# Patient Record
Sex: MALE | Race: OTHER | NOT HISPANIC OR LATINO | URBAN - METROPOLITAN AREA
[De-identification: names, ages, dates, MRNs, and addresses within clinical notes are randomized per-mention and may not be internally consistent; named-entity substitution may affect disease eponyms.]

---

## 2023-04-14 ENCOUNTER — INPATIENT (INPATIENT)
Facility: HOSPITAL | Age: 53
LOS: 2 days | Discharge: HOME | DRG: 65 | End: 2023-04-17
Attending: PSYCHIATRY & NEUROLOGY | Admitting: PSYCHIATRY & NEUROLOGY
Payer: COMMERCIAL

## 2023-04-14 VITALS
SYSTOLIC BLOOD PRESSURE: 145 MMHG | WEIGHT: 173.94 LBS | HEART RATE: 81 BPM | OXYGEN SATURATION: 99 % | DIASTOLIC BLOOD PRESSURE: 100 MMHG | TEMPERATURE: 98 F | RESPIRATION RATE: 17 BRPM

## 2023-04-14 LAB
ALBUMIN SERPL ELPH-MCNC: 4.1 G/DL — SIGNIFICANT CHANGE UP (ref 3.4–5)
ALP SERPL-CCNC: 125 U/L — HIGH (ref 40–120)
ALT FLD-CCNC: 41 U/L — SIGNIFICANT CHANGE UP (ref 12–42)
AMPHET UR-MCNC: NEGATIVE — SIGNIFICANT CHANGE UP
ANION GAP SERPL CALC-SCNC: 9 MMOL/L — SIGNIFICANT CHANGE UP (ref 9–16)
ANISOCYTOSIS BLD QL: SIGNIFICANT CHANGE UP
APPEARANCE UR: CLEAR — SIGNIFICANT CHANGE UP
APTT BLD: 33.3 SEC — SIGNIFICANT CHANGE UP (ref 27.5–35.5)
AST SERPL-CCNC: 27 U/L — SIGNIFICANT CHANGE UP (ref 15–37)
BARBITURATES UR SCN-MCNC: NEGATIVE — SIGNIFICANT CHANGE UP
BASOPHILS # BLD AUTO: 0.02 K/UL — SIGNIFICANT CHANGE UP (ref 0–0.2)
BASOPHILS NFR BLD AUTO: 0.3 % — SIGNIFICANT CHANGE UP (ref 0–2)
BENZODIAZ UR-MCNC: NEGATIVE — SIGNIFICANT CHANGE UP
BILIRUB SERPL-MCNC: 0.9 MG/DL — SIGNIFICANT CHANGE UP (ref 0.2–1.2)
BILIRUB UR-MCNC: NEGATIVE — SIGNIFICANT CHANGE UP
BUN SERPL-MCNC: 12 MG/DL — SIGNIFICANT CHANGE UP (ref 7–23)
CALCIUM SERPL-MCNC: 9.3 MG/DL — SIGNIFICANT CHANGE UP (ref 8.5–10.5)
CHLORIDE SERPL-SCNC: 105 MMOL/L — SIGNIFICANT CHANGE UP (ref 96–108)
CO2 SERPL-SCNC: 27 MMOL/L — SIGNIFICANT CHANGE UP (ref 22–31)
COCAINE METAB.OTHER UR-MCNC: NEGATIVE — SIGNIFICANT CHANGE UP
COLOR SPEC: YELLOW — SIGNIFICANT CHANGE UP
CREAT SERPL-MCNC: 1.13 MG/DL — SIGNIFICANT CHANGE UP (ref 0.5–1.3)
DIFF PNL FLD: ABNORMAL
EGFR: 77 ML/MIN/1.73M2 — SIGNIFICANT CHANGE UP
EOSINOPHIL # BLD AUTO: 0.08 K/UL — SIGNIFICANT CHANGE UP (ref 0–0.5)
EOSINOPHIL NFR BLD AUTO: 1.2 % — SIGNIFICANT CHANGE UP (ref 0–6)
GLUCOSE SERPL-MCNC: 109 MG/DL — HIGH (ref 70–99)
GLUCOSE UR QL: NEGATIVE — SIGNIFICANT CHANGE UP
HCT VFR BLD CALC: 42 % — SIGNIFICANT CHANGE UP (ref 39–50)
HGB BLD-MCNC: 14.7 G/DL — SIGNIFICANT CHANGE UP (ref 13–17)
IMM GRANULOCYTES NFR BLD AUTO: 0.1 % — SIGNIFICANT CHANGE UP (ref 0–0.9)
INR BLD: 1.18 — HIGH (ref 0.88–1.16)
KETONES UR-MCNC: NEGATIVE — SIGNIFICANT CHANGE UP
LACTATE SERPL-SCNC: 0.9 MMOL/L — SIGNIFICANT CHANGE UP (ref 0.4–2)
LEUKOCYTE ESTERASE UR-ACNC: NEGATIVE — SIGNIFICANT CHANGE UP
LYMPHOCYTES # BLD AUTO: 2.6 K/UL — SIGNIFICANT CHANGE UP (ref 1–3.3)
LYMPHOCYTES # BLD AUTO: 37.9 % — SIGNIFICANT CHANGE UP (ref 13–44)
MACROCYTES BLD QL: SIGNIFICANT CHANGE UP
MANUAL SMEAR VERIFICATION: SIGNIFICANT CHANGE UP
MCHC RBC-ENTMCNC: 35 GM/DL — SIGNIFICANT CHANGE UP (ref 32–36)
MCHC RBC-ENTMCNC: 38.9 PG — HIGH (ref 27–34)
MCV RBC AUTO: 111.1 FL — HIGH (ref 80–100)
METHADONE UR-MCNC: NEGATIVE — SIGNIFICANT CHANGE UP
MICROCYTES BLD QL: SLIGHT — SIGNIFICANT CHANGE UP
MONOCYTES # BLD AUTO: 0.65 K/UL — SIGNIFICANT CHANGE UP (ref 0–0.9)
MONOCYTES NFR BLD AUTO: 9.5 % — SIGNIFICANT CHANGE UP (ref 2–14)
NEUTROPHILS # BLD AUTO: 3.5 K/UL — SIGNIFICANT CHANGE UP (ref 1.8–7.4)
NEUTROPHILS NFR BLD AUTO: 51 % — SIGNIFICANT CHANGE UP (ref 43–77)
NITRITE UR-MCNC: NEGATIVE — SIGNIFICANT CHANGE UP
NRBC # BLD: 0 /100 WBCS — SIGNIFICANT CHANGE UP (ref 0–0)
OPIATES UR-MCNC: NEGATIVE — SIGNIFICANT CHANGE UP
PCP SPEC-MCNC: SIGNIFICANT CHANGE UP
PCP UR-MCNC: NEGATIVE — SIGNIFICANT CHANGE UP
PH UR: 7 — SIGNIFICANT CHANGE UP (ref 5–8)
PLAT MORPH BLD: NORMAL — SIGNIFICANT CHANGE UP
PLATELET # BLD AUTO: 256 K/UL — SIGNIFICANT CHANGE UP (ref 150–400)
POTASSIUM SERPL-MCNC: 4.2 MMOL/L — SIGNIFICANT CHANGE UP (ref 3.5–5.3)
POTASSIUM SERPL-SCNC: 4.2 MMOL/L — SIGNIFICANT CHANGE UP (ref 3.5–5.3)
PROT SERPL-MCNC: 7.6 G/DL — SIGNIFICANT CHANGE UP (ref 6.4–8.2)
PROT UR-MCNC: NEGATIVE MG/DL — SIGNIFICANT CHANGE UP
PROTHROM AB SERPL-ACNC: 13.8 SEC — HIGH (ref 10.5–13.4)
RBC # BLD: 3.78 M/UL — LOW (ref 4.2–5.8)
RBC # FLD: 12.7 % — SIGNIFICANT CHANGE UP (ref 10.3–14.5)
RBC BLD AUTO: ABNORMAL
RBC CASTS # UR COMP ASSIST: < 5 /HPF — SIGNIFICANT CHANGE UP
SARS-COV-2 RNA SPEC QL NAA+PROBE: SIGNIFICANT CHANGE UP
SODIUM SERPL-SCNC: 141 MMOL/L — SIGNIFICANT CHANGE UP (ref 132–145)
SP GR SPEC: <=1.005 — SIGNIFICANT CHANGE UP (ref 1–1.03)
THC UR QL: POSITIVE
TROPONIN I, HIGH SENSITIVITY RESULT: 7.7 NG/L — SIGNIFICANT CHANGE UP
UROBILINOGEN FLD QL: 0.2 E.U./DL — SIGNIFICANT CHANGE UP
WBC # BLD: 6.86 K/UL — SIGNIFICANT CHANGE UP (ref 3.8–10.5)
WBC # FLD AUTO: 6.86 K/UL — SIGNIFICANT CHANGE UP (ref 3.8–10.5)
WBC UR QL: < 5 /HPF — SIGNIFICANT CHANGE UP

## 2023-04-14 PROCEDURE — 70498 CT ANGIOGRAPHY NECK: CPT | Mod: 26

## 2023-04-14 PROCEDURE — 99285 EMERGENCY DEPT VISIT HI MDM: CPT

## 2023-04-14 PROCEDURE — 70496 CT ANGIOGRAPHY HEAD: CPT | Mod: 26

## 2023-04-14 PROCEDURE — 0042T: CPT | Mod: 26

## 2023-04-14 PROCEDURE — 71045 X-RAY EXAM CHEST 1 VIEW: CPT | Mod: 26

## 2023-04-14 PROCEDURE — 70551 MRI BRAIN STEM W/O DYE: CPT | Mod: 26

## 2023-04-14 PROCEDURE — 70450 CT HEAD/BRAIN W/O DYE: CPT | Mod: 26

## 2023-04-14 RX ORDER — OLMESARTAN MEDOXOMIL 5 MG/1
1 TABLET, FILM COATED ORAL
Refills: 0 | DISCHARGE

## 2023-04-14 RX ORDER — ASPIRIN/CALCIUM CARB/MAGNESIUM 324 MG
81 TABLET ORAL DAILY
Refills: 0 | Status: DISCONTINUED | OUTPATIENT
Start: 2023-04-14 | End: 2023-04-17

## 2023-04-14 RX ORDER — ASPIRIN/CALCIUM CARB/MAGNESIUM 324 MG
325 TABLET ORAL ONCE
Refills: 0 | Status: COMPLETED | OUTPATIENT
Start: 2023-04-14 | End: 2023-04-14

## 2023-04-14 RX ORDER — CLOPIDOGREL BISULFATE 75 MG/1
75 TABLET, FILM COATED ORAL DAILY
Refills: 0 | Status: DISCONTINUED | OUTPATIENT
Start: 2023-04-14 | End: 2023-04-17

## 2023-04-14 RX ORDER — ATORVASTATIN CALCIUM 80 MG/1
80 TABLET, FILM COATED ORAL AT BEDTIME
Refills: 0 | Status: DISCONTINUED | OUTPATIENT
Start: 2023-04-14 | End: 2023-04-15

## 2023-04-14 RX ORDER — CLOPIDOGREL BISULFATE 75 MG/1
300 TABLET, FILM COATED ORAL ONCE
Refills: 0 | Status: COMPLETED | OUTPATIENT
Start: 2023-04-14 | End: 2023-04-14

## 2023-04-14 RX ORDER — LABETALOL HCL 100 MG
10 TABLET ORAL ONCE
Refills: 0 | Status: COMPLETED | OUTPATIENT
Start: 2023-04-14 | End: 2023-04-14

## 2023-04-14 RX ORDER — LAMIVUDINE AND ZIDOVUDINE 150; 300 MG/1; MG/1
1 TABLET, FILM COATED ORAL
Refills: 0 | DISCHARGE

## 2023-04-14 RX ORDER — ATORVASTATIN CALCIUM 80 MG/1
80 TABLET, FILM COATED ORAL ONCE
Refills: 0 | Status: COMPLETED | OUTPATIENT
Start: 2023-04-14 | End: 2023-04-14

## 2023-04-14 RX ADMIN — Medication 10 MILLIGRAM(S): at 13:28

## 2023-04-14 RX ADMIN — Medication 81 MILLIGRAM(S): at 22:41

## 2023-04-14 RX ADMIN — CLOPIDOGREL BISULFATE 300 MILLIGRAM(S): 75 TABLET, FILM COATED ORAL at 15:25

## 2023-04-14 RX ADMIN — CLOPIDOGREL BISULFATE 75 MILLIGRAM(S): 75 TABLET, FILM COATED ORAL at 22:41

## 2023-04-14 RX ADMIN — Medication 325 MILLIGRAM(S): at 15:25

## 2023-04-14 RX ADMIN — ATORVASTATIN CALCIUM 80 MILLIGRAM(S): 80 TABLET, FILM COATED ORAL at 22:41

## 2023-04-14 RX ADMIN — ATORVASTATIN CALCIUM 80 MILLIGRAM(S): 80 TABLET, FILM COATED ORAL at 15:25

## 2023-04-14 NOTE — ED PROVIDER NOTE - PROGRESS NOTE DETAILS
normal HCT, discussed case w stroke code attending Dr Montano and pt Is not candidate for thrombolytis as deficit is minor. If symptoms do not resolve will need admission for MRI. BP on arrival 145/100. Will monitor and repeat. Pt w improved BP after labetalol dose. Neuro Dr Montano recommends , Plaavix 300, Lipitor 80, Admit to Neuro Tele for further ischemic work up.

## 2023-04-14 NOTE — H&P ADULT - NSHPPHYSICALEXAM_GEN_ALL_CORE
General: No acute distress, awake and alert  Cardiovascular: Regular rate and rhythm  Pulmonary: Anterior breath sounds clear bilaterally, no crackles or wheezing. No use of accessory muscles  GI: Abdomen soft, non-tender, non-distended    Neurologic:  -Mental status: Awake, alert, oriented to person, place, and time. Speech is fluent with intact naming, repetition, and comprehension, no dysarthria. Recent and remote memory intact. Follows commands. Attention/concentration intact. Fund of knowledge appropriate.  -Cranial nerves:   II: Visual fields are full to confrontation.  III, IV, VI: Extraocular movements are intact without nystagmus. Pupils equally round and reactive to light  V:  Facial sensation V1-V3 equal and intact   VII: Face is symmetric with normal eye closure and smile  VIII: Hearing is bilaterally intact   XI: Head turning and shoulder shrug are intact.  XII: Tongue protrudes midline  Motor: Normal bulk and tone. No pronator drift. Strength bilateral upper extremity 5/5, bilateral lower extremities 5/5.  Sensation: Decreased sensation on the right arm and leg, ~98%. No neglect or extinction on double simultaneous testing.  Coordination: No dysmetria on finger-to-nose bilaterally      NIHSS: 1

## 2023-04-14 NOTE — ED ADULT NURSE NOTE - OBJECTIVE STATEMENT
Patient presents with right sided arm and leg numbness and tingling sensation that started around 1145 this morning. Recently diagnosed with HTN and is started on meds. Patient was in UC and sent in for evaluation. Stroke code called. Patient went to CT scan. Currently patient reports numbness is improving to the right leg. Still has some numbness to right arm. Patient is hypertensive 145/100. Denies headache, nausea, vomiting, vision change, gait imbalance, confusion, SOB, CP, weakness, speech problem, dizziness.

## 2023-04-14 NOTE — H&P ADULT - NSHPLABSRESULTS_GEN_ALL_CORE
Fingerstick Blood Glucose: CAPILLARY BLOOD GLUCOSE      POCT Blood Glucose.: 116 mg/dL (14 Apr 2023 15:08)    LABS:                        14.7   6.86  )-----------( 256      ( 14 Apr 2023 13:00 )             42.0     04-14    141  |  105  |  12  ----------------------------<  109<H>  4.2   |  27  |  1.13    Ca    9.3      14 Apr 2023 13:00    TPro  7.6  /  Alb  4.1  /  TBili  0.9  /  DBili  x   /  AST  27  /  ALT  41  /  AlkPhos  125<H>  04-14    PT/INR - ( 14 Apr 2023 13:00 )   PT: 13.8 sec;   INR: 1.18          PTT - ( 14 Apr 2023 13:00 )  PTT:33.3 sec      Urinalysis Basic - ( 14 Apr 2023 13:21 )    Color: Yellow / Appearance: Clear / SG: <=1.005 / pH: x  Gluc: x / Ketone: NEGATIVE  / Bili: NEGATIVE / Urobili: 0.2 E.U./dL   Blood: x / Protein: NEGATIVE mg/dL / Nitrite: NEGATIVE   Leuk Esterase: NEGATIVE / RBC: < 5 /HPF / WBC < 5 /HPF   Sq Epi: x / Non Sq Epi: x / Bacteria: x      RADIOLOGY  < from: CT Brain Stroke Protocol (04.14.23 @ 12:51) >      IMPRESSION: No intracranial hemorrhage or acute transcortical infarct.    < end of copied text >    Abnormal perfusion scan, with small but questionable defects:  Small left inferior frontal prolonged Tmax and small right parietal lobe   CBV deficit.    < end of copied text >    < from: CT Angio Neck Stroke Protocol w/ IV Cont (04.14.23 @ 13:09) >    Extracranial CTA:  1. No arterial steno-occlusive disease or evidence of dissection.  2. Moderately advanced cervical spondylosis present with at least   moderate spinal stenosis at several levels.    < end of copied text >    < from: CT Angio Brain Stroke Protocol  w/ IV Cont (04.14.23 @ 13:13) >    Intracranial CTA: No arterial steno-occlusive disease.    < end of copied text >

## 2023-04-14 NOTE — ED PROVIDER NOTE - CLINICAL SUMMARY MEDICAL DECISION MAKING FREE TEXT BOX
Pt presents for R sided arm/leg numbness for 1 hr. no motor symptoms, no  aphasia, no ataxia. Stroke code work up started.

## 2023-04-14 NOTE — ED PROVIDER NOTE - WR ORDER ID 1
social groups, or volunteer to help others. Being alone sometimes makes things seem worse than they are. · Get at least 30 minutes of exercise on most days of the week to relieve stress. Walking is a good choice. You also may want to do other activities, such as running, swimming, cycling, or playing tennis or team sports. Relaxation techniques  Do relaxation exercises 10 to 20 minutes a day. You can play soothing, relaxing music while you do them, if you wish. · Tell others in your house that you are going to do your relaxation exercises. Ask them not to disturb you. · Find a comfortable place, away from all distractions and noise. · Lie down on your back, or sit with your back straight. · Focus on your breathing. Make it slow and steady. · Breathe in through your nose. Breathe out through either your nose or mouth. · Breathe deeply, filling up the area between your navel and your rib cage. Breathe so that your belly goes up and down. · Do not hold your breath. · Breathe like this for 5 to 10 minutes. Notice the feeling of calmness throughout your whole body. As you continue to breathe slowly and deeply, relax by doing the following for another 5 to 10 minutes:  · Tighten and relax each muscle group in your body. You can begin at your toes and work your way up to your head. · Imagine your muscle groups relaxing and becoming heavy. · Empty your mind of all thoughts. · Let yourself relax more and more deeply. · Become aware of the state of calmness that surrounds you. · When your relaxation time is over, you can bring yourself back to alertness by moving your fingers and toes and then your hands and feet and then stretching and moving your entire body. Sometimes people fall asleep during relaxation, but they usually wake up shortly afterward. · Always give yourself time to return to full alertness before you drive a car or do anything that might cause an accident if you are not fully alert.  Never [id number]

## 2023-04-14 NOTE — ED PROVIDER NOTE - OBJECTIVE STATEMENT
med: caletra, combivert, olmesartan medoximil 53 yo male pt, hx of HIV, undetctable VL, meds: caletra, combivert. Recently dx w HTN and started on olmesartan medoximil 2 weeks ago. Today was in a meeting and started experiencing R sided arm/leg tingling and numbness. Symptoms started around 1 hr PTA, around 11:45 am. No motor symptoms, no aphasia, no ataxia. Pt walked to UC and then told to come to ED and pt walked to ED. On arrival BP is 145/100. no hx pf prior strokes. Hx of prior R hip replacement and L knee replacement.

## 2023-04-14 NOTE — H&P ADULT - NSHPSOCIALHISTORY_GEN_ALL_CORE
Patient lives with partner at apt  Smoking status:no  Drinkin-2 drinks daily with dinner  Drug Use: marijuana use daily

## 2023-04-14 NOTE — H&P ADULT - ASSESSMENT
52y Male with PMHx of HIV, newly diagnosed HTN 2 weeks ago presented to [facility] with right sided numbness in face, arm and leg. LKW 11:45am 4/14 while in meeting at work with sudden onset of symptoms, sensation down to 70%.  NIHSS 1 for decreased sensation. CTH/CTA unremarkable, but CTP suspicious for defect along left frontal and right parietal area.     Neuro  #CVA   - continue aspirin 81mg and plavix 75mg daily  - continue atorvastatin 80mg daily  - q4hr stroke neuro checks and vitals  - obtain MRI Brain without contrast  - Stroke Code HCT Results: neg  - Stroke Code CTA Results: neg  - Stroke education    Cards  #HTN  - permissive hypertension, Goal -180  - hold home blood pressure medication for now, olmesartan 20mg daily  - obtain TTE with bubble, ANASTASIA  - Stroke Code EKG Results: NSR    - high dose statin as above in CVA  - LDL results:     Pulm  - call provider if SPO2 < 94%    GI  #Nutrition/Fluids/Electrolytes   - replete K<4 and Mg <2  - Diet:: DASH  - IVF: none    Infectious Disease  #HIV  Reportedly viral load undetectable  - c/w home kaletra and combivir    Endocrine  - A1C results:   - TSH results:    DVT Prophylaxis  - lovenox sq for DVT prophylaxis   - SCDs for DVT prophylaxis       Dispo: pending PT/OT assessment, likely no needs     Discussed daily hospital plans and goals with patient and family at bedside.     Discussed with Neurology Attending Dr. Montano

## 2023-04-14 NOTE — ED PROVIDER NOTE - NEUROLOGICAL, MLM
Alert and oriented, normal strength, decreased sensation R face, R arm, R leg, no aphasia, no ataxia

## 2023-04-14 NOTE — H&P ADULT - NSHPREVIEWOFSYSTEMS_GEN_ALL_CORE
onstitutional: No fever, weight loss or fatigue  Eyes: No eye pain, visual disturbances, or discharge  ENMT:  No difficulty hearing, tinnitus, vertigo; No sinus or throat pain  Neck: No pain or stiffness  Respiratory: No cough, wheezing, chills or hemoptysis  Cardiovascular: No chest pain, palpitations, shortness of breath, dizziness or leg swelling  Gastrointestinal: No abdominal pain. No nausea, vomiting or hematemesis; No diarrhea or constipation. Nohematochezia.  Genitourinary: No dysuria, frequency, hematuria or incontinence  Neurological: As per HPI

## 2023-04-14 NOTE — PATIENT PROFILE ADULT - FALL HARM RISK - UNIVERSAL INTERVENTIONS
180.9 Bed in lowest position, wheels locked, appropriate side rails in place/Call bell, personal items and telephone in reach/Instruct patient to call for assistance before getting out of bed or chair/Non-slip footwear when patient is out of bed/[place] to call system/Physically safe environment - no spills, clutter or unnecessary equipment/Purposeful Proactive Rounding/Room/bathroom lighting operational, light cord in reach

## 2023-04-14 NOTE — H&P ADULT - HISTORY OF PRESENT ILLNESS
hx HIV, newly diagnosed HTN 2 weeks ago, LKW 11:45 while patient in meeting at work with sudden onset of right sided numbness in face, arm, leg. Decreased sensation down to 70%. Initially thought leg fell asleep/nerve problem, but did not improve. Went to  where BP was elevated around 170s (normally pt BP 140s). Sent to [facility]. NIHSS 1 for decreased right sided sensation/numbness. CT unremarkable. Plan for MRI and echo. DAPT.     full note to follow **STROKE HPI***    HPI: 52y Male with PMHx of HIV, newly diagnosed HTN 2 weeks ago presented to [facility] with right sided numbness in face, arm and leg. LKW 11:45am 4/14 while in meeting at work with sudden onset of symptoms. Initialy thought leg and arm fell asleep or nerve issue that would resolve on its own. Decreased sensation down to 70%. Patient walked to urgent care and was sent [facility]. On presentation, BP 170s (normally 140s). NIHSS 1 for decreased sensation. CTH/CTA unremarkable, but CTP suspicious for defect along left frontal and right parietal area. Patient currently reports resolution of facial numbness and significantly improving arm and leg numbness. Denies speech changes, vision changes, extremity weakness or loss of dexterity, CP, SOB, recent illness.

## 2023-04-15 LAB
A1C WITH ESTIMATED AVERAGE GLUCOSE RESULT: 5 % — SIGNIFICANT CHANGE UP (ref 4–5.6)
ANION GAP SERPL CALC-SCNC: 10 MMOL/L — SIGNIFICANT CHANGE UP (ref 5–17)
BUN SERPL-MCNC: 10 MG/DL — SIGNIFICANT CHANGE UP (ref 7–23)
CALCIUM SERPL-MCNC: 9.4 MG/DL — SIGNIFICANT CHANGE UP (ref 8.4–10.5)
CHLORIDE SERPL-SCNC: 105 MMOL/L — SIGNIFICANT CHANGE UP (ref 96–108)
CHOLEST SERPL-MCNC: 180 MG/DL — SIGNIFICANT CHANGE UP
CHOLEST SERPL-MCNC: 185 MG/DL — SIGNIFICANT CHANGE UP
CK SERPL-CCNC: 129 U/L — SIGNIFICANT CHANGE UP (ref 30–200)
CO2 SERPL-SCNC: 22 MMOL/L — SIGNIFICANT CHANGE UP (ref 22–31)
CREAT SERPL-MCNC: 0.8 MG/DL — SIGNIFICANT CHANGE UP (ref 0.5–1.3)
D DIMER BLD IA.RAPID-MCNC: <150 NG/ML DDU — SIGNIFICANT CHANGE UP
EGFR: 105 ML/MIN/1.73M2 — SIGNIFICANT CHANGE UP
ESTIMATED AVERAGE GLUCOSE: 97 MG/DL — SIGNIFICANT CHANGE UP (ref 68–114)
GLUCOSE SERPL-MCNC: 96 MG/DL — SIGNIFICANT CHANGE UP (ref 70–99)
HCT VFR BLD CALC: 43.2 % — SIGNIFICANT CHANGE UP (ref 39–50)
HCYS SERPL-MCNC: 8.3 UMOL/L — SIGNIFICANT CHANGE UP
HDLC SERPL-MCNC: 46 MG/DL — SIGNIFICANT CHANGE UP
HDLC SERPL-MCNC: 52 MG/DL — SIGNIFICANT CHANGE UP
HGB BLD-MCNC: 15 G/DL — SIGNIFICANT CHANGE UP (ref 13–17)
LIPID PNL WITH DIRECT LDL SERPL: 106 MG/DL — HIGH
LIPID PNL WITH DIRECT LDL SERPL: 111 MG/DL — HIGH
MAGNESIUM SERPL-MCNC: 2.1 MG/DL — SIGNIFICANT CHANGE UP (ref 1.6–2.6)
MCHC RBC-ENTMCNC: 34.7 GM/DL — SIGNIFICANT CHANGE UP (ref 32–36)
MCHC RBC-ENTMCNC: 39.4 PG — HIGH (ref 27–34)
MCV RBC AUTO: 113.4 FL — HIGH (ref 80–100)
NON HDL CHOLESTEROL: 133 MG/DL — HIGH
NON HDL CHOLESTEROL: 134 MG/DL — HIGH
NRBC # BLD: 0 /100 WBCS — SIGNIFICANT CHANGE UP (ref 0–0)
PHOSPHATE SERPL-MCNC: 3.8 MG/DL — SIGNIFICANT CHANGE UP (ref 2.5–4.5)
PLATELET # BLD AUTO: 239 K/UL — SIGNIFICANT CHANGE UP (ref 150–400)
POTASSIUM SERPL-MCNC: 4 MMOL/L — SIGNIFICANT CHANGE UP (ref 3.5–5.3)
POTASSIUM SERPL-SCNC: 4 MMOL/L — SIGNIFICANT CHANGE UP (ref 3.5–5.3)
RBC # BLD: 3.81 M/UL — LOW (ref 4.2–5.8)
RBC # FLD: 13.1 % — SIGNIFICANT CHANGE UP (ref 10.3–14.5)
SODIUM SERPL-SCNC: 137 MMOL/L — SIGNIFICANT CHANGE UP (ref 135–145)
TRIGL SERPL-MCNC: 109 MG/DL — SIGNIFICANT CHANGE UP
TRIGL SERPL-MCNC: 138 MG/DL — SIGNIFICANT CHANGE UP
TSH SERPL-MCNC: 1.2 UIU/ML — SIGNIFICANT CHANGE UP (ref 0.27–4.2)
WBC # BLD: 5.85 K/UL — SIGNIFICANT CHANGE UP (ref 3.8–10.5)
WBC # FLD AUTO: 5.85 K/UL — SIGNIFICANT CHANGE UP (ref 3.8–10.5)

## 2023-04-15 PROCEDURE — 99233 SBSQ HOSP IP/OBS HIGH 50: CPT

## 2023-04-15 PROCEDURE — 99222 1ST HOSP IP/OBS MODERATE 55: CPT

## 2023-04-15 PROCEDURE — 99254 IP/OBS CNSLTJ NEW/EST MOD 60: CPT

## 2023-04-15 RX ORDER — ALBUTEROL 90 UG/1
2 AEROSOL, METERED ORAL EVERY 6 HOURS
Refills: 0 | Status: ACTIVE | OUTPATIENT
Start: 2023-04-15 | End: 2024-03-13

## 2023-04-15 RX ORDER — LOSARTAN POTASSIUM 100 MG/1
25 TABLET, FILM COATED ORAL DAILY
Refills: 0 | Status: ACTIVE | OUTPATIENT
Start: 2023-04-15 | End: 2024-03-13

## 2023-04-15 RX ORDER — ATORVASTATIN CALCIUM 80 MG/1
10 TABLET, FILM COATED ORAL AT BEDTIME
Refills: 0 | Status: DISCONTINUED | OUTPATIENT
Start: 2023-04-15 | End: 2023-04-17

## 2023-04-15 RX ORDER — LORATADINE 10 MG/1
10 TABLET ORAL DAILY
Refills: 0 | Status: ACTIVE | OUTPATIENT
Start: 2023-04-15 | End: 2024-03-13

## 2023-04-15 RX ORDER — ENOXAPARIN SODIUM 100 MG/ML
40 INJECTION SUBCUTANEOUS EVERY 24 HOURS
Refills: 0 | Status: DISCONTINUED | OUTPATIENT
Start: 2023-04-15 | End: 2023-04-17

## 2023-04-15 RX ADMIN — LORATADINE 10 MILLIGRAM(S): 10 TABLET ORAL at 19:06

## 2023-04-15 RX ADMIN — ATORVASTATIN CALCIUM 10 MILLIGRAM(S): 80 TABLET, FILM COATED ORAL at 21:28

## 2023-04-15 RX ADMIN — CLOPIDOGREL BISULFATE 75 MILLIGRAM(S): 75 TABLET, FILM COATED ORAL at 12:25

## 2023-04-15 RX ADMIN — LOSARTAN POTASSIUM 25 MILLIGRAM(S): 100 TABLET, FILM COATED ORAL at 11:31

## 2023-04-15 RX ADMIN — ENOXAPARIN SODIUM 40 MILLIGRAM(S): 100 INJECTION SUBCUTANEOUS at 12:25

## 2023-04-15 RX ADMIN — Medication 2 TABLET(S): at 07:45

## 2023-04-15 RX ADMIN — Medication 81 MILLIGRAM(S): at 12:25

## 2023-04-15 NOTE — CONSULT NOTE ADULT - ASSESSMENT
52M with PMH Of HTN and HIV, presenting with Right sided numbness of the face, arm and leg.  Admitted for further workup.     #Right sided numbness  -#CVA  - plan per primary team  - started on aspirin and plavix, as well as atorvastatin (may need to adjust dose for HIV medications)  - continue neuro checks  - PT/OT consult  - DVT ppx per primary team  - MRI with  Acute right frontal lobe white matter infarct, and acute left thalamic   lacunar infarct which explains symptoms.  - pending TTE and ANASTASIA.        #HTN  - permissive hypertension, Goal -180  - can resume antihypertensives per primary team    #HIV  Reportedly viral load undetectable.  Compliant with meds  - continue home kaletra and combivir    >60 minutes spent on this encounter, including face to face with patient, care coordination and documentation.     
Neurology    52 y o Male with PMHx of HIV, newly diagnosed HTN 2 weeks ago presented to [facility] with right sided numbness in face, arm and leg. LKW 11:45am 4/14 while in meeting at work with sudden onset of symptoms, sensation down to 70%.  NIHSS 1 for decreased sensation. CTH/CTA unremarkable, but CTP suspicious for defect along left frontal and right parietal area.     Neuro  #CVA   - continue aspirin 81mg and plavix 75mg daily  - continue atorvastatin 80mg daily  - q4hr stroke neuro checks and vitals  - obtain MRI Brain without contrast  - Stroke Code HCT Results: neg  - Stroke Code CTA Results: neg  - Stroke education    Cards  #HTN  - permissive hypertension, Goal -180  - hold home blood pressure medication for now, olmesartan 20mg daily  - obtain TTE with bubble, ANASTASIA  - Stroke Code EKG Results: NSR    - high dose statin as above in CVA  - LDL results:     Pulm  - call provider if SPO2 < 94%    GI  #Nutrition/Fluids/Electrolytes   - replete K<4 and Mg <2  - Diet:: DASH  - IVF: none    Infectious Disease  #HIV  Reportedly viral load undetectable  - c/w home kaletra and combivir    Endocrine  - A1C results:   - TSH results:    DVT Prophylaxis  - lovenox sq for DVT prophylaxis   - SCDs for DVT prophylaxis

## 2023-04-15 NOTE — PROGRESS NOTE ADULT - SUBJECTIVE AND OBJECTIVE BOX
Neurology Stroke Progress Note    INTERVAL HPI/OVERNIGHT EVENTS:  Patient seen and examined this morning by stroke ACP and attending. Patient aware of MRI results showing stroke. Explained plan with patient at length. Denies any complaints and states that he is back to his baseline.     MEDICATIONS  (STANDING):  aspirin enteric coated 81 milliGRAM(s) Oral daily  atorvastatin 10 milliGRAM(s) Oral at bedtime  clopidogrel Tablet 75 milliGRAM(s) Oral daily  enoxaparin Injectable 40 milliGRAM(s) SubCutaneous every 24 hours  lopinavir 200 mG/ritonavir 50 mG 2 Tablet(s) Oral two times a day  losartan 25 milliGRAM(s) Oral daily  zidovudine 300 mG/lamivudine 150 mG 1 Tablet(s) Oral two times a day    MEDICATIONS  (PRN):      Allergies    No Known Allergies    Intolerances        ROS: As per HPI, otherwise negative    Vital Signs Last 24 Hrs  T(C): 36.4 (15 Apr 2023 10:21), Max: 36.9 (14 Apr 2023 15:30)  T(F): 97.6 (15 Apr 2023 10:21), Max: 98.5 (14 Apr 2023 15:30)  HR: 66 (15 Apr 2023 09:07) (62 - 81)  BP: 168/104 (15 Apr 2023 09:07) (143/86 - 171/94)  BP(mean): 131 (15 Apr 2023 09:07) (119 - 131)  RR: 17 (15 Apr 2023 09:07) (16 - 20)  SpO2: 99% (15 Apr 2023 09:07) (95% - 100%)    Parameters below as of 15 Apr 2023 09:07  Patient On (Oxygen Delivery Method): room air        Physical exam:  General: awake and alert, sitting comfortably, no acute distress    Neurologic:  Mental status: awake, alert, oriented to person, place, and time. Speech is fluent, able to name objects. Follows commands. Attention/concentration intact. No dysarthria, no aphasia.  Cranial nerves:   II: visual fields are full to confrontation. pupils equally round and reactive to light,   III, IV, VI: EOMI without nystagmus  V:  V1-V3 sensation intact,   VII: no facial droop, facie is symmetric with normal eye closure and smile  Motor: Normal bulk and tone, strength 5/5 in b/l UE and LE,  strength 5/5. No pronator drift  Sensation: intact to light touch. No neglect.  Coordination: No dysmetria on finger-to-nose bilaterally  Reflexes: downgoing toes bilaterally  Gait: narrow and steady, no ataxia. Romberg negative    LABS:                        15.0   5.85  )-----------( 239      ( 15 Apr 2023 05:30 )             43.2     04-15    137  |  105  |  10  ----------------------------<  96  4.0   |  22  |  0.80    Ca    9.4      15 Apr 2023 05:30  Phos  3.8     04-15  Mg     2.1     04-15    TPro  7.6  /  Alb  4.1  /  TBili  0.9  /  DBili  x   /  AST  27  /  ALT  41  /  AlkPhos  125<H>  04-14    PT/INR - ( 14 Apr 2023 13:00 )   PT: 13.8 sec;   INR: 1.18          PTT - ( 14 Apr 2023 13:00 )  PTT:33.3 sec  Urinalysis Basic - ( 14 Apr 2023 13:21 )    Color: Yellow / Appearance: Clear / SG: <=1.005 / pH: x  Gluc: x / Ketone: NEGATIVE  / Bili: NEGATIVE / Urobili: 0.2 E.U./dL   Blood: x / Protein: NEGATIVE mg/dL / Nitrite: NEGATIVE   Leuk Esterase: NEGATIVE / RBC: < 5 /HPF / WBC < 5 /HPF   Sq Epi: x / Non Sq Epi: x / Bacteria: x        RADIOLOGY & ADDITIONAL TESTS:     MR Head No Cont (04.14.23 @ 21:27) >    IMPRESSION:    Acute right frontal lobe white matter infarct, and acute left thalamic   lacunar infarct which explains symptoms.      CT Brain Stroke Protocol (04.14.23 @ 12:51) >    IMPRESSION: No intracranial hemorrhage or acute transcortical infarct.     CT Angio Neck Stroke Protocol w/ IV Cont (04.14.23 @ 13:09) >  IMPRESSION:    Intracranial CTA: No arterial steno-occlusive disease.    Extracranial CTA:  1. No arterial steno-occlusive disease or evidence of dissection.  2. Moderately advanced cervical spondylosis present with at least   moderate spinal stenosis at several levels.    CT Perfusion w/ Maps w/ IV Cont (04.14.23 @ 13:07) >  IMPRESSION:  Abnormal perfusion scan, with small but questionable defects:  Small left inferior frontal prolonged Tmax and small right parietal lobe   CBV deficit.

## 2023-04-15 NOTE — PHYSICAL THERAPY INITIAL EVALUATION ADULT - ADDITIONAL COMMENTS
Pt lives with partner in an elevator access apartment. Pt reports to be indpt with all ADLs prior to admission and denies use of AD for ambulation.

## 2023-04-15 NOTE — PHYSICAL THERAPY INITIAL EVALUATION ADULT - PERTINENT HX OF CURRENT PROBLEM, REHAB EVAL
52y Male with PMHx of HIV, newly diagnosed HTN 2 weeks ago presented to [facility] with right sided numbness in face, arm and leg. LKW 11:45am 4/14 while in meeting at work with sudden onset of symptoms. Initialy thought leg and arm fell asleep or nerve issue that would resolve on its own. Decreased sensation down to 70%. Patient walked to urgent care and was sent [facility]. On presentation, BP 170s (normally 140s). NIHSS 1 for decreased sensation. CTH/CTA unremarkable, but CTP suspicious for defect along left frontal and right parietal area. Patient currently reports resolution of facial numbness and significantly improving arm and leg numbness. Denies speech changes, vision changes, extremity weakness or loss of dexterity, CP, SOB, recent illness.

## 2023-04-15 NOTE — PHYSICAL THERAPY INITIAL EVALUATION ADULT - MODALITIES TREATMENT COMMENTS
--- hand dominant; (L) hand grip5 /5, (R) hand  5/5. CN Testing: B/L Frontalis intact; B/L buccinator intact; smile symmetrical; tongue protrusion at midline; B/L eyes open/close intact; Shoulder elevation: intact bilaterally; Vision H-Test: bilateral tracking and smooth pursuit intact; Convergence/Divergence: intact; Vision Quadrant Test: intact bilaterally

## 2023-04-15 NOTE — PROGRESS NOTE ADULT - ASSESSMENT
Assessment and Plan  52y Male with PMHx of HIV, newly diagnosed HTN 2 weeks ago presented to [facility] with right sided numbness in face, arm and leg. LKW 11:45am 4/14 while in meeting at work with sudden onset of symptoms, sensation down to 70%.  NIHSS 1 for decreased sensation. CTH/CTA unremarkable, but CTP suspicious for defect along left frontal and right parietal area. MRI Brain showed Acute right frontal lobe white matter infarct, and acute left thalamic lacunar infarct. Pending TTE/ANASTASIA.     Neuro  #CVA   - continue aspirin 81mg and plavix 75mg daily  -  decrease atorvastatin to 10mg daily (due to HIV meds increasing effect of atorvastatin)  - send CK today, repeat CK 3 days later and monitor for Statin side effects   - q4hr stroke neuro checks and vitals  - Stroke Code HCT Results: neg  - Stroke Code CTA Results: neg  - Stroke education    Cards  #HTN  - allow for normotension   - takes olmesartan 20mg daily at home (started on Losartan 25mg daily)  - obtain TTE with bubble  - obtain ANASTASIA   - consider ILR, started discussion with patient- states he will consider   - Stroke Code EKG Results: NSR      - Statin as above   - LDL results: 106    Pulm  - call provider if SPO2 < 94%    GI  #Nutrition/Fluids/Electrolytes   - replete K<4 and Mg <2  - Diet:: DASH  - IVF: none    Infectious Disease  #HIV  Reportedly viral load undetectable  - c/w home kaletra and combivir    Endocrine  - A1C results: 5.0  - TSH results: 1.20    DVT Prophylaxis  - lovenox sq for DVT prophylaxis   - SCDs for DVT prophylaxis       Dispo: pending PT, OT home no needs     Discussed daily hospital plans and goals with patient and family at bedside.     Discussed with Neurology Attending Dr. Wu

## 2023-04-15 NOTE — CONSULT NOTE ADULT - SUBJECTIVE AND OBJECTIVE BOX
Patient is a 52y old  Male who presents with a chief complaint of right sided weakness (15 Apr 2023 11:19)      HPI:  **STROKE HPI***    HPI: 52y Male with PMHx of HIV, newly diagnosed HTN 2 weeks ago presented to [facility] with right sided numbness in face, arm and leg. LKW 11:45am 4/14 while in meeting at work with sudden onset of symptoms. Initialy thought leg and arm fell asleep or nerve issue that would resolve on its own. Decreased sensation down to 70%. Patient walked to urgent care and was sent [facility]. On presentation, BP 170s (normally 140s). NIHSS 1 for decreased sensation. CTH/CTA unremarkable, but CTP suspicious for defect along left frontal and right parietal area. Patient currently reports resolution of facial numbness and significantly improving arm and leg numbness. Denies speech changes, vision changes, extremity weakness or loss of dexterity, CP, SOB, recent illness.    (14 Apr 2023 20:43)    PAST MEDICAL & SURGICAL HISTORY:  HIV disease      HTN (hypertension)        MEDICATIONS  (STANDING):  aspirin enteric coated 81 milliGRAM(s) Oral daily  atorvastatin 10 milliGRAM(s) Oral at bedtime  clopidogrel Tablet 75 milliGRAM(s) Oral daily  enoxaparin Injectable 40 milliGRAM(s) SubCutaneous every 24 hours  lopinavir 200 mG/ritonavir 50 mG 2 Tablet(s) Oral two times a day  losartan 25 milliGRAM(s) Oral daily  zidovudine 300 mG/lamivudine 150 mG 1 Tablet(s) Oral two times a day    MEDICATIONS  (PRN):          Social History:  denied ETOH abuse,  IVDA,  recreational drugs            -  Home Living Status :  lives            -  Prior Home Care Services :  none     hrs x  days/week           -  Family support :            -  Occupation :     Baseline Functional Level Prior to Admission :             - ADL's/ IADL's :  independent , requires partial assistance with            - Ambulatory status prior to admission :   walked with         FAMILY HISTORY:      CBC Full  -  ( 15 Apr 2023 05:30 )  WBC Count : 5.85 K/uL  RBC Count : 3.81 M/uL  Hemoglobin : 15.0 g/dL  Hematocrit : 43.2 %  Platelet Count - Automated : 239 K/uL  Mean Cell Volume : 113.4 fl  Mean Cell Hemoglobin : 39.4 pg  Mean Cell Hemoglobin Concentration : 34.7 gm/dL  Auto Neutrophil # : x  Auto Lymphocyte # : x  Auto Monocyte # : x  Auto Eosinophil # : x  Auto Basophil # : x  Auto Neutrophil % : x  Auto Lymphocyte % : x  Auto Monocyte % : x  Auto Eosinophil % : x  Auto Basophil % : x      04-15    137  |  105  |  10  ----------------------------<  96  4.0   |  22  |  0.80    Ca    9.4      15 Apr 2023 05:30  Phos  3.8     04-15  Mg     2.1     04-15    TPro  7.6  /  Alb  4.1  /  TBili  0.9  /  DBili  x   /  AST  27  /  ALT  41  /  AlkPhos  125<H>  04-14      Urinalysis Basic - ( 14 Apr 2023 13:21 )    Color: Yellow / Appearance: Clear / SG: <=1.005 / pH: x  Gluc: x / Ketone: NEGATIVE  / Bili: NEGATIVE / Urobili: 0.2 E.U./dL   Blood: x / Protein: NEGATIVE mg/dL / Nitrite: NEGATIVE   Leuk Esterase: NEGATIVE / RBC: < 5 /HPF / WBC < 5 /HPF   Sq Epi: x / Non Sq Epi: x / Bacteria: x        Radiology :     < from: MR Head No Cont (04.14.23 @ 21:27) >  ACC: [id number] EXAM:  MR BRAIN   ORDERED BY: FANI FLORES     PROCEDURE DATE:  04/14/2023          INTERPRETATION:  CLINICAL INDICATION: Right-sided numbness. Evaluate for   stroke.    TECHNIQUE: Limited multi-sequential MR imaging of the brain was performed   without intravenous contrast. Note, diffusion and T2-FLAIR axial   sequences through the whole brain were obtained only.    COMPARISON: CT of the head from 4/14/2023.    FINDINGS:  Small focus of restricted diffusion in the right frontal lobe with   corresponding T2/FLAIR hyperintensity consistent with acute infarct   (series 5 image 56). There is also an acute left thalamic lacunar infarct.    No acute intracranial hemorrhage or mass.    T2/FLAIR hyperintensity of the subcortical and periventricular white   matter is nonspecific but most likely represents sequela of mild   microvascular ischemic disease.    The ventricles are normal without evidence of hydrocephalus. There are no   extra-axial fluid collections. Left maxillary sinus retention cyst/polyp.    IMPRESSION:    Acute right frontal lobe white matter infarct, and acute left thalamic   lacunar infarct which explains symptoms.      < from: CT Brain Stroke Protocol (04.14.23 @ 12:51) >  ACC: [id number] EXAM:  CT BRAIN STROKE PROTOCOL   ORDERED BY: TERRA GANDARA     PROCEDURE DATE:  04/14/2023          INTERPRETATION:  PROCEDURE: CT head without intravenous contrast    INDICATION: CVA    TECHNIQUE: Multiple axial images were obtained at 5 mm intervals from the   skull base to the vertex. Sagittal and coronal reformatted images were   obtained from the axial data set. The images were reviewed in brain and   bone windows. RAPID AI was used for preliminary interpretation for  hemorrhage detection.    COMPARISON: None    FINDINGS: The CT examination demonstrates the ventricles, cisternal   spaces, and cortical sulci to be within normal limits. There is no   midline shift or extra axial collections. The gray white differentiation   appears within normal limits. There is no intracranial hemorrhage or   acute transcortical infarct. The bony windows demonstrates no fractures.   The visualized paranasal sinuses are within normal limits. The mastoid   air cells are well aerated.    The study was performed at 12:50 and the above findings were discussed   with SINDY Oh at 12:55 pm.    IMPRESSION: No intracranial hemorrhage or acute transcortical infarct.    < end of copied text >    < from: CT Angio Brain Stroke Protocol  w/ IV Cont (04.14.23 @ 13:13) >  ACC: [id number] EXAM:  CT ANGIO NECK STROKE PROTCL IC   ORDERED BY: TERRA GANDARA     ACC: [id number] EXAM:  CT ANGIO BRAIN STROKE PROTC IC   ORDERED BY: TERRA GANDARA     PROCEDURE DATE:  04/14/2023          INTERPRETATION:  PROCEDURES:  CTA brain with intravenous contrast.  CTA neck with intravenous contrast.    INDICATION: Right sided numbness. Stroke code    TECHNIQUE: Multiple axial thin section were obtained from the aortic arch   through the neck and intracranial compartment up to the calvarial vertex.   Imaging is done after the IV bolus of 80 cc Omnipaque 350; 25 cc   discarded. MIP series are provided.    COMPARISON: None    FINDINGS:    INTRACRANIAL:  The internal carotid arteries are patent at the skull base and   intracranial compartment without occlusion or high grade stenosis. The   anterior and middle cerebral arteries are patent at their 1st and 2nd   order segments, and appear symmetric caliber. The posterior circulation   shows no high grade stenosis or occlusion. The intracranial vertebral   arteries, the basilar artery and both posterior cerebral arteries are   patent. 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 There is no site of aneurysm within the   resolution limitations of CTA.    EXTRACRANIAL:    The aortic arch is normal size and shows patency, with variant 2 vessel   configuration. No significant great vessel stenosis.    Both common carotid arteries are patent without stenosis. Similarly, both   internal carotid arteries are patent at bifurcations and up to the skull   base without stenosis or dissection.    Vertebral arteries are patent at their origins and throughout their   course in the neck, without stenosis or evidence of dissection.    There is moderately advanced mid cervical spondylosis and reversal of   lordosis. Spinal stenosis is at least moderate if not severe at C4-7   levels.      IMPRESSION:    Intracranial CTA: No arterial steno-occlusive disease.    Extracranial CTA:  1. No arterial steno-occlusive disease or evidence of dissection.  2. Moderately advanced cervical spondylosis present with at least   moderate spinal stenosis at several levels.      < end of copied text >          REVIEW OF SYSTEMS:      CONSTITUTIONAL: No fever, weight loss, or fatigue  EYES: No eye pain, visual disturbances, or discharge  ENMT:  No difficulty hearing, tinnitus, vertigo; No sinus or throat pain  NECK: No pain or stiffness  BREASTS: No pain, masses, or nipple discharge  RESPIRATORY: No cough, wheezing, chills or hemoptysis; No shortness of breath  CARDIOVASCULAR: No chest pain, palpitations, dizziness, or leg swelling  GASTROINTESTINAL: No abdominal or epigastric pain. No nausea, vomiting, or hematemesis; No diarrhea or constipation. No melena or hematochezia.  GENITOURINARY: No dysuria, frequency, hematuria, or incontinence  NEUROLOGICAL: per hpi   LYMPH NODES: No enlarged glands  ENDOCRINE: No heat or cold intolerance; No hair loss  MUSCULOSKELETAL: No joint pain or swelling; No muscle, back, or extremity pain  PSYCHIATRIC: No depression, anxiety, mood swings, or difficulty sleeping  HEME/LYMPH: No easy bruising, or bleeding gums  ALLERGY AND IMMUNOLOGIC: No hives or eczema  VASCULAR: no swelling , erythema          Vital Signs Last 24 Hrs  T(C): 36.4 (15 Apr 2023 10:21), Max: 36.9 (14 Apr 2023 15:30)  T(F): 97.6 (15 Apr 2023 10:21), Max: 98.5 (14 Apr 2023 15:30)  HR: 66 (15 Apr 2023 09:07) (62 - 81)  BP: 168/104 (15 Apr 2023 09:07) (143/86 - 171/94)  BP(mean): 131 (15 Apr 2023 09:07) (119 - 131)  RR: 17 (15 Apr 2023 09:07) (16 - 20)  SpO2: 99% (15 Apr 2023 09:07) (95% - 100%)    Parameters below as of 15 Apr 2023 09:07  Patient On (Oxygen Delivery Method): room air            Physical Exam :  WDWN 52 y o man sitting up in bed awake, alert , no acute complaints     Head : normocephalic , atraumatic    Eyes : PERRLA , EOMI , no nystagmus , sclera anicteric    ENT : neg nasal discharge , uvula midline , no oropharyngeal erythema / exudate    Neck : supple , negative JVD , negative carotid bruits , no thyromegaly    Chest : CTA bilaterally     Cardiovascular : regular rate and rhythm     Abdomen : soft , non distended , non tender to palpation in all 4 quadrants ,normal bowel sounds     Extremities : WWP , neg cyanosis /clubbing / edema     Neurologic Exam :    Alert and oriented to person , place , date/year , speech fluent w/o dysarthria , follows commands , recent and remote memory intact , repetition intact , comprehension intact ,  attention/concentration intact , fund of knowledge appropriate    Cranial Nerves:     II :                         pupils equal , round and reactive to light , visual fields intact to BTT  III/ IV/VI :              extraocular movements intact , neg nystagmus , neg ptosis  V :                        facial sensation intact , V1-3 normal  VII :                      face symmetric , no droop , normal eye closure and smile  VIII :                     hearing intact to finger rub bilaterally  IX and X :             no hoarseness , gag intact , palate/ uvula rise symmetrically  XI :                       SCM / trapezius strength intact bilateral  XII :                      no tongue deviation    Motor Exam:          Right UE:               5/5 :     5/5 :   wrist extensors/ flexors  5/5 :   biceps  5/5 :   triceps  5/5 :   deltoid    negative pronator drift                            Left UE:                 5/5 :     5/5 :   wrist extensors/ flexors  5/5 :   biceps  5/5 :   triceps  5/5 :   deltoid    negative pronator drift        Right LE:                5/5 : dorsiflexors   5/5 : plantar flexors  5/5 : quadriceps  5/5 : hamstrings  5/5 : hip flexors      Left LE:                  5/5 : dorsiflexors   5/5 : plantar flexors  5/5 : quadriceps  5/5 : hamstrings  5/5 : hip flexors      Sensation :         intact to light touch x 4 extremities                            no neglect or extinction on double simultaneous testing      DTR :                     biceps/brachioradialis : equal                              patella/ankle : equal                                                              neg Babinski    Coordination :      Finger to Nose :  neg dysmetria bilaterally    Heel to shin : wnl bilaterally      Rapid Alternating movements :  neg dysdiadochokinesia bilaterally      Gait :  not tested      PM&R Impression : admitted for c/o R sided tingling     1) no acute pathology on CT brain imaging  MRI revealed acute right frontal and thalamic lacunar infarcts     2) no focal neuro deficits with exception to improving R sided sensory deficits           Recommendations / Plan :      1) Physical / Occupational therapy focusing on therapeutic exercises , equipment evaluation , bed mobility/transfer out of bed evaluation , progressive ambulation with assistive devices prn .    2) Current disposition plan recommendation  :    probable d/c home ,  no rehab needs     
See below for stroke HPI:  " 52y Male with PMHx of HIV, newly diagnosed HTN 2 weeks ago presented to [facility] with right sided numbness in face, arm and leg. LKW 11:45am 4/14 while in meeting at work with sudden onset of symptoms. Initialy thought leg and arm fell asleep or nerve issue that would resolve on its own. Decreased sensation down to 70%. Patient walked to urgent care and was sent [facility]. On presentation, BP 170s (normally 140s). NIHSS 1 for decreased sensation. CTH/CTA unremarkable, but CTP suspicious for defect along left frontal and right parietal area. Patient currently reports resolution of facial numbness and significantly improving arm and leg numbness. Denies speech changes, vision changes, extremity weakness or loss of dexterity, CP, SOB, recent illness.   "    MEDICATIONS  (STANDING):  aspirin enteric coated 81 milliGRAM(s) Oral daily  atorvastatin 10 milliGRAM(s) Oral at bedtime  clopidogrel Tablet 75 milliGRAM(s) Oral daily  enoxaparin Injectable 40 milliGRAM(s) SubCutaneous every 24 hours  lopinavir 200 mG/ritonavir 50 mG 2 Tablet(s) Oral two times a day  losartan 25 milliGRAM(s) Oral daily  zidovudine 300 mG/lamivudine 150 mG 1 Tablet(s) Oral two times a day    MEDICATIONS  (PRN):      Allergies    No Known Allergies    Intolerances      ROS: As per HPI, otherwise negative    Vital Signs Last 24 Hrs  T(C): 36.4 (15 Apr 2023 10:21), Max: 36.9 (14 Apr 2023 15:30)  T(F): 97.6 (15 Apr 2023 10:21), Max: 98.5 (14 Apr 2023 15:30)  HR: 66 (15 Apr 2023 09:07) (62 - 81)  BP: 168/104 (15 Apr 2023 09:07) (143/86 - 171/94)  BP(mean): 131 (15 Apr 2023 09:07) (119 - 131)  RR: 17 (15 Apr 2023 09:07) (16 - 20)  SpO2: 99% (15 Apr 2023 09:07) (95% - 100%)    Parameters below as of 15 Apr 2023 09:07  Patient On (Oxygen Delivery Method): room air    Physical exam:   General: no acute distress, sitting up in bed  HEENT: normocephalic, atraumatic, MMM  Cards: RRR, normal S1S2  Pulm: CTAB, no wheeze, crackles, rales or rhonchi  Ab: soft nontender, nondistended  Neuro: follows commands, no facial asymmetry, moves all extremities equally  Ext: wwp, no edema, erythema or calf tenderness

## 2023-04-16 LAB
ANION GAP SERPL CALC-SCNC: 10 MMOL/L — SIGNIFICANT CHANGE UP (ref 5–17)
BUN SERPL-MCNC: 11 MG/DL — SIGNIFICANT CHANGE UP (ref 7–23)
CALCIUM SERPL-MCNC: 8.7 MG/DL — SIGNIFICANT CHANGE UP (ref 8.4–10.5)
CHLORIDE SERPL-SCNC: 104 MMOL/L — SIGNIFICANT CHANGE UP (ref 96–108)
CO2 SERPL-SCNC: 24 MMOL/L — SIGNIFICANT CHANGE UP (ref 22–31)
CREAT SERPL-MCNC: 0.88 MG/DL — SIGNIFICANT CHANGE UP (ref 0.5–1.3)
EGFR: 102 ML/MIN/1.73M2 — SIGNIFICANT CHANGE UP
GLUCOSE SERPL-MCNC: 100 MG/DL — HIGH (ref 70–99)
HCT VFR BLD CALC: 44.5 % — SIGNIFICANT CHANGE UP (ref 39–50)
HGB BLD-MCNC: 15.4 G/DL — SIGNIFICANT CHANGE UP (ref 13–17)
MAGNESIUM SERPL-MCNC: 2 MG/DL — SIGNIFICANT CHANGE UP (ref 1.6–2.6)
MCHC RBC-ENTMCNC: 34.6 GM/DL — SIGNIFICANT CHANGE UP (ref 32–36)
MCHC RBC-ENTMCNC: 39.1 PG — HIGH (ref 27–34)
MCV RBC AUTO: 112.9 FL — HIGH (ref 80–100)
NRBC # BLD: 0 /100 WBCS — SIGNIFICANT CHANGE UP (ref 0–0)
PHOSPHATE SERPL-MCNC: 3.3 MG/DL — SIGNIFICANT CHANGE UP (ref 2.5–4.5)
PLATELET # BLD AUTO: 244 K/UL — SIGNIFICANT CHANGE UP (ref 150–400)
POTASSIUM SERPL-MCNC: 3.8 MMOL/L — SIGNIFICANT CHANGE UP (ref 3.5–5.3)
POTASSIUM SERPL-SCNC: 3.8 MMOL/L — SIGNIFICANT CHANGE UP (ref 3.5–5.3)
RBC # BLD: 3.94 M/UL — LOW (ref 4.2–5.8)
RBC # FLD: 13 % — SIGNIFICANT CHANGE UP (ref 10.3–14.5)
SODIUM SERPL-SCNC: 138 MMOL/L — SIGNIFICANT CHANGE UP (ref 135–145)
WBC # BLD: 5.58 K/UL — SIGNIFICANT CHANGE UP (ref 3.8–10.5)
WBC # FLD AUTO: 5.58 K/UL — SIGNIFICANT CHANGE UP (ref 3.8–10.5)

## 2023-04-16 PROCEDURE — 99232 SBSQ HOSP IP/OBS MODERATE 35: CPT

## 2023-04-16 PROCEDURE — 99233 SBSQ HOSP IP/OBS HIGH 50: CPT

## 2023-04-16 RX ORDER — HYDRALAZINE HCL 50 MG
5 TABLET ORAL ONCE
Refills: 0 | Status: COMPLETED | OUTPATIENT
Start: 2023-04-16 | End: 2023-04-16

## 2023-04-16 RX ADMIN — CLOPIDOGREL BISULFATE 75 MILLIGRAM(S): 75 TABLET, FILM COATED ORAL at 12:29

## 2023-04-16 RX ADMIN — ATORVASTATIN CALCIUM 10 MILLIGRAM(S): 80 TABLET, FILM COATED ORAL at 21:04

## 2023-04-16 RX ADMIN — ENOXAPARIN SODIUM 40 MILLIGRAM(S): 100 INJECTION SUBCUTANEOUS at 12:29

## 2023-04-16 RX ADMIN — LORATADINE 10 MILLIGRAM(S): 10 TABLET ORAL at 13:22

## 2023-04-16 RX ADMIN — Medication 1 TABLET(S): at 06:31

## 2023-04-16 RX ADMIN — Medication 1 TABLET(S): at 18:05

## 2023-04-16 RX ADMIN — Medication 81 MILLIGRAM(S): at 12:29

## 2023-04-16 RX ADMIN — LOSARTAN POTASSIUM 25 MILLIGRAM(S): 100 TABLET, FILM COATED ORAL at 06:31

## 2023-04-16 RX ADMIN — Medication 5 MILLIGRAM(S): at 17:13

## 2023-04-16 NOTE — OCCUPATIONAL THERAPY INITIAL EVALUATION ADULT - LIVES WITH, PROFILE
Pt lives with his partner in apt with 3 steps to enter. Pt at baseline is ind for ADLs and functional mobility. + R hand, + wears glasses occasionally. No DME needed.

## 2023-04-16 NOTE — PROGRESS NOTE ADULT - SUBJECTIVE AND OBJECTIVE BOX
Feeling fine today.  Really wants to walk around.  Hoping to be discharged soon.      MEDICATIONS  (STANDING):  aspirin enteric coated 81 milliGRAM(s) Oral daily  atorvastatin 10 milliGRAM(s) Oral at bedtime  clopidogrel Tablet 75 milliGRAM(s) Oral daily  enoxaparin Injectable 40 milliGRAM(s) SubCutaneous every 24 hours  lopinavir 200 mG/ritonavir 50 mG 1 Tablet(s) Oral <User Schedule>  loratadine 10 milliGRAM(s) Oral daily  losartan 25 milliGRAM(s) Oral daily  zidovudine 300 mG/lamivudine 150 mG 1 Tablet(s) Oral <User Schedule>    MEDICATIONS  (PRN):  albuterol    90 MICROgram(s) HFA Inhaler 2 Puff(s) Inhalation every 6 hours PRN Respiratory Distress      Allergies    No Known Allergies    Intolerances        ROS: As per HPI, otherwise negative    Vital Signs Last 24 Hrs  T(C): 36.8 (16 Apr 2023 10:00), Max: 37.1 (16 Apr 2023 01:28)  T(F): 98.2 (16 Apr 2023 10:00), Max: 98.7 (16 Apr 2023 01:28)  HR: 70 (16 Apr 2023 12:35) (60 - 72)  BP: 151/96 (16 Apr 2023 12:35) (147/104 - 178/100)  BP(mean): 119 (16 Apr 2023 12:35) (119 - 132)  RR: 17 (16 Apr 2023 12:35) (16 - 17)  SpO2: 99% (16 Apr 2023 12:35) (96% - 99%)    Parameters below as of 16 Apr 2023 12:35  Patient On (Oxygen Delivery Method): room air    Physical exam  General: no acute distress, sitting up in bed  HEENT: normocephalic, atrauamtic, MMM  Cards: RRR, S1/S2, no mrg  Pulm: CTAB, no wheeze  Ab: soft, ntnd, normoactive bowel sounds  Ext: wwp, no edema  Neuro: no focal deficits

## 2023-04-16 NOTE — PROGRESS NOTE ADULT - ASSESSMENT
52M with PMH Of HTN and HIV, presenting with Right sided numbness of the face, arm and leg.  Admitted for further workup.     #Right sided numbness  -#CVA  - plan per primary team  - aspirin and plavix, as well as atorvastatin (may need to adjust dose for HIV medications)  - continue neuro checks  - PT/OT consult  - DVT ppx per primary team  - MRI with  Acute right frontal lobe white matter infarct, and acute left thalamic   lacunar infarct which explains symptoms.  - pending TTE and ANASTASIA.        #HTN  - permissive hypertension, Goal -180  - can resume antihypertensives per primary team    #HIV  Reportedly viral load undetectable.  Compliant with meds  - continue home kaletra and combivir    >35 minutes spent on this encounter, including face to face with patient, care coordination and documentation.

## 2023-04-16 NOTE — PROGRESS NOTE ADULT - ASSESSMENT
Assessment and Plan  52y Male    1)Secondary stroke prevention  -ASA 81 and Plavix 75  -Atorvastatin 80    2) Stroke risk factors  -    3) Further workup     DVT prophylaxis   -Heparin SQ TID and SCDs Assessment and Plan  52y Male with PMHx of HIV, newly diagnosed HTN 2 weeks ago presented to [facility] with right sided numbness in face, arm and leg. LKW 11:45am 4/14 while in meeting at work with sudden onset of symptoms, sensation down to 70%.  NIHSS 1 for decreased sensation. CTH/CTA unremarkable, but CTP suspicious for defect along left frontal and right parietal area. MRI Brain showed Acute right frontal lobe white matter infarct, and acute left thalamic lacunar infarct. Pending TTE/ANASTASIA.     Neuro  #CVA   - continue aspirin 81mg and plavix 75mg daily  -  decrease atorvastatin to 10mg daily (due to HIV meds increasing effect of atorvastatin)  - CK on 4/15 - 129, repeat CK 3 later and monitor for Statin side effects   - q4hr stroke neuro checks and vitals  - Stroke Code HCT Results: neg  - Stroke Code CTA Results: neg  - Stroke education    Cards  #HTN  - allow for normotension   - takes olmesartan 20mg daily at home (started on Losartan 25mg daily)  - continue losartan 25mg daily  - obtain TTE with bubble  - obtain ANASTASIA   - consider ILR, started discussion with patient- states he will consider   - Stroke Code EKG Results: NSR    - Statin as above   - LDL results: 106    Pulm  - call provider if SPO2 < 94%    GI  #Nutrition/Fluids/Electrolytes   - replete K<4 and Mg <2  - Diet:: DASH  - IVF: none    Infectious Disease  #HIV  Reportedly viral load undetectable  - c/w home kaletra and combivir  - ordered for 6am and 6pm which is how he takes them at home    Endocrine  - A1C results: 5.0  - TSH results: 1.20    DVT Prophylaxis  - lovenox sq for DVT prophylaxis   - SCDs for DVT prophylaxis       Dispo: No PT & OT needs     Discussed daily hospital plans and goals with patient and family at bedside.     Discussed with Neurology Attending Dr. Wu            Assessment and Plan  52y Male with PMHx of HIV, newly diagnosed HTN 2 weeks ago presented to [facility] with right sided numbness in face, arm and leg. LKW 11:45am 4/14 while in meeting at work with sudden onset of symptoms, sensation down to 70%.  NIHSS 1 for decreased sensation. CTH/CTA unremarkable, but CTP suspicious for defect along left frontal and right parietal area. MRI Brain showed Acute right frontal lobe white matter infarct, and acute left thalamic lacunar infarct. Pending TTE/ANASTASIA.     Neuro  #CVA   - continue aspirin 81mg and plavix 75mg daily  -  decrease atorvastatin to 10mg daily (due to HIV meds increasing effect of atorvastatin)  - CK on 4/15 - 129, repeat CK 3 later and monitor for Statin side effects   - q4hr stroke neuro checks and vitals  - Stroke Code HCT Results: neg  - Stroke Code CTA Results: neg  - Stroke education    Cards  #HTN  - allow for normotension   - takes olmesartan 20mg daily at home (started on Losartan 25mg daily)  - continue losartan 25mg daily  - obtain TTE with bubble  - obtain ANASTASIA   - consider ILR, started discussion with patient- states he will consider   - Stroke Code EKG Results: NSR    - Statin as above   - LDL results: 106    Pulm  - call provider if SPO2 < 94%  - takes proair PRN at home for congestion, continue with Proair  - ordered Claritin daily as patient takes at home for seasonal allergies    GI  #Nutrition/Fluids/Electrolytes   - replete K<4 and Mg <2  - Diet:: DASH  - IVF: none    Infectious Disease  #HIV  Reportedly viral load undetectable  - c/w home kaletra and combivir  - ordered for 6am and 6pm which is how he takes them at home    Endocrine  - A1C results: 5.0  - TSH results: 1.20    DVT Prophylaxis  - lovenox sq for DVT prophylaxis   - SCDs for DVT prophylaxis     Dispo: No PT & OT needs     Discussed daily hospital plans and goals with patient and family at bedside.     Discussed with Neurology Attending Dr. Wu

## 2023-04-16 NOTE — OCCUPATIONAL THERAPY INITIAL EVALUATION ADULT - PERTINENT HX OF CURRENT PROBLEM, REHAB EVAL
52y Male with PMHx of HIV, newly diagnosed HTN 2 weeks ago presented to [facility] with right sided numbness in face, arm and leg. LKW 11:45am 4/14 while in meeting at work with sudden onset of symptoms, sensation down to 70%.  NIHSS 1 for decreased sensation. CTH/CTA unremarkable, but CTP suspicious for defect along left frontal and right parietal area. MRI Brain showed Acute right frontal lobe white matter infarct, and acute left thalamic lacunar infarct.

## 2023-04-16 NOTE — PROGRESS NOTE ADULT - SUBJECTIVE AND OBJECTIVE BOX
Neurology Stroke Progress Note    INTERVAL HPI/OVERNIGHT EVENTS:  Patient seen and examined.     MEDICATIONS  (STANDING):  aspirin enteric coated 81 milliGRAM(s) Oral daily  atorvastatin 10 milliGRAM(s) Oral at bedtime  clopidogrel Tablet 75 milliGRAM(s) Oral daily  enoxaparin Injectable 40 milliGRAM(s) SubCutaneous every 24 hours  lopinavir 200 mG/ritonavir 50 mG 1 Tablet(s) Oral <User Schedule>  loratadine 10 milliGRAM(s) Oral daily  losartan 25 milliGRAM(s) Oral daily  zidovudine 300 mG/lamivudine 150 mG 1 Tablet(s) Oral <User Schedule>    MEDICATIONS  (PRN):  albuterol    90 MICROgram(s) HFA Inhaler 2 Puff(s) Inhalation every 6 hours PRN Respiratory Distress      Allergies    No Known Allergies    Intolerances        ROS: As per HPI, otherwise negative    Vital Signs Last 24 Hrs  T(C): 36.8 (16 Apr 2023 10:00), Max: 37.1 (16 Apr 2023 01:28)  T(F): 98.2 (16 Apr 2023 10:00), Max: 98.7 (16 Apr 2023 01:28)  HR: 70 (16 Apr 2023 12:35) (60 - 72)  BP: 151/96 (16 Apr 2023 12:35) (147/104 - 178/100)  BP(mean): 119 (16 Apr 2023 12:35) (119 - 132)  RR: 17 (16 Apr 2023 12:35) (16 - 17)  SpO2: 99% (16 Apr 2023 12:35) (96% - 99%)    Parameters below as of 16 Apr 2023 12:35  Patient On (Oxygen Delivery Method): room air        Physical exam:  General: awake and alert, sitting comfortably, no acute distress    Neurologic:  Mental status: awake, alert, oriented to person, place, and time. Speech is fluent, able to name objects. Follows commands. Attention/concentration intact. No dysarthria, no aphasia.  Cranial nerves:   II: visual fields are full to confrontation. pupils equally round and reactive to light,   III, IV, VI: EOMI without nystagmus  V:  V1-V3 sensation intact,   VII: no facial droop, facie is symmetric with normal eye closure and smile  VIII: hearing is intact to finger rub  IX, X: Uvula is midline and soft palate rises symmetrically  XI: head turning and shoulder shrug are intact.  XII: tongue midline  Motor: Normal bulk and tone, strength 5/5 in b/l UE and LE,  strength 5/5. No pronator drift  Sensation: intact to light touch. No neglect.  Coordination: No dysmetria on finger-to-nose and heel-to-shin  Reflexes: downgoing toes bilaterally  Gait: narrow and steady, no ataxia. Romberg negative        LABS:                        15.4   5.58  )-----------( 244      ( 16 Apr 2023 06:52 )             44.5     04-16    138  |  104  |  11  ----------------------------<  100<H>  3.8   |  24  |  0.88    Ca    8.7      16 Apr 2023 06:52  Phos  3.3     04-16  Mg     2.0     04-16            RADIOLOGY & ADDITIONAL TESTS:       Neurology Stroke Progress Note    INTERVAL HPI/OVERNIGHT EVENTS:  No acute events overnight.   Patient seen and examined this morning. Patient denies any complaints. States that he has been getting out of bed.     MEDICATIONS  (STANDING):  aspirin enteric coated 81 milliGRAM(s) Oral daily  atorvastatin 10 milliGRAM(s) Oral at bedtime  clopidogrel Tablet 75 milliGRAM(s) Oral daily  enoxaparin Injectable 40 milliGRAM(s) SubCutaneous every 24 hours  lopinavir 200 mG/ritonavir 50 mG 1 Tablet(s) Oral <User Schedule>  loratadine 10 milliGRAM(s) Oral daily  losartan 25 milliGRAM(s) Oral daily  zidovudine 300 mG/lamivudine 150 mG 1 Tablet(s) Oral <User Schedule>    MEDICATIONS  (PRN):  albuterol    90 MICROgram(s) HFA Inhaler 2 Puff(s) Inhalation every 6 hours PRN Respiratory Distress      Allergies    No Known Allergies    Intolerances        ROS: As per HPI, otherwise negative    Vital Signs Last 24 Hrs  T(C): 36.8 (16 Apr 2023 10:00), Max: 37.1 (16 Apr 2023 01:28)  T(F): 98.2 (16 Apr 2023 10:00), Max: 98.7 (16 Apr 2023 01:28)  HR: 70 (16 Apr 2023 12:35) (60 - 72)  BP: 151/96 (16 Apr 2023 12:35) (147/104 - 178/100)  BP(mean): 119 (16 Apr 2023 12:35) (119 - 132)  RR: 17 (16 Apr 2023 12:35) (16 - 17)  SpO2: 99% (16 Apr 2023 12:35) (96% - 99%)    Parameters below as of 16 Apr 2023 12:35  Patient On (Oxygen Delivery Method): room air    Physical exam:  General: awake and alert, sitting comfortably, no acute distress    Neurologic:  Mental status: awake, alert, oriented to person, place, and time. Speech is fluent, able to name objects. Follows commands. Attention/concentration intact. No dysarthria, no aphasia.  Cranial nerves:   II: visual fields are full to confrontation. pupils equally round and reactive to light,   III, IV, VI: EOMI without nystagmus  V:  V1-V3 sensation intact,   VII: no facial droop, facie is symmetric with normal eye closure and smile  Motor: Normal bulk and tone, strength 5/5 in b/l UE and LE,  strength 5/5. No pronator drift  Sensation: intact to light touch. No neglect.  Coordination: No dysmetria on finger-to-nose and heel-to-shin  Reflexes: downgoing toes bilaterally  Gait: narrow and steady, no ataxia. Romberg negative      LABS:                        15.4   5.58  )-----------( 244      ( 16 Apr 2023 06:52 )             44.5     04-16    138  |  104  |  11  ----------------------------<  100<H>  3.8   |  24  |  0.88    Ca    8.7      16 Apr 2023 06:52  Phos  3.3     04-16  Mg     2.0     04-16      RADIOLOGY & ADDITIONAL TESTS:  MR Head No Cont (04.14.23 @ 21:27) >  IMPRESSION:    Acute right frontal lobe white matter infarct, and acute left thalamic   lacunar infarct which explains symptoms.    CT Angio Brain Stroke Protocol  w/ IV Cont (04.14.23 @ 13:13) >  IMPRESSION:    Intracranial CTA: No arterial steno-occlusive disease.    Extracranial CTA:  1. No arterial steno-occlusive disease or evidence of dissection.  2. Moderately advanced cervical spondylosis present with at least   moderate spinal stenosis at several levels.    CT Perfusion w/ Maps w/ IV Cont (04.14.23 @ 13:07) >  IMPRESSION:  Abnormal perfusion scan, with small but questionable defects:  Small left inferior frontal prolonged Tmax and small right parietal lobe   CBV deficit.

## 2023-04-16 NOTE — OCCUPATIONAL THERAPY INITIAL EVALUATION ADULT - LIGHT TOUCH SENSATION, LLE, REHAB EVAL
Requested medication(s) are due for refill today: Yes  Patient has already received a courtesy refill: No  Other reason request has been forwarded to provider: within normal limits

## 2023-04-17 ENCOUNTER — TRANSCRIPTION ENCOUNTER (OUTPATIENT)
Age: 53
End: 2023-04-17

## 2023-04-17 VITALS — TEMPERATURE: 98 F

## 2023-04-17 LAB
4/8 RATIO: 1.57 RATIO — SIGNIFICANT CHANGE UP (ref 0.9–3.6)
ABS CD8: 385 CELLS/UL — SIGNIFICANT CHANGE UP (ref 142–740)
ANION GAP SERPL CALC-SCNC: 12 MMOL/L — SIGNIFICANT CHANGE UP (ref 5–17)
AT III ACT/NOR PPP CHRO: 82 % — LOW (ref 85–135)
B2 GLYCOPROT1 AB SER QL: NEGATIVE — SIGNIFICANT CHANGE UP
BUN SERPL-MCNC: 12 MG/DL — SIGNIFICANT CHANGE UP (ref 7–23)
CALCIUM SERPL-MCNC: 8.9 MG/DL — SIGNIFICANT CHANGE UP (ref 8.4–10.5)
CARDIOLIPIN AB SER-ACNC: POSITIVE
CD3 BLASTS SPEC-ACNC: 69 % — SIGNIFICANT CHANGE UP (ref 59–83)
CD3 BLASTS SPEC-ACNC: 999 CELLS/UL — SIGNIFICANT CHANGE UP (ref 672–1870)
CD4 %: 42 % — SIGNIFICANT CHANGE UP (ref 30–62)
CD8 %: 27 % — SIGNIFICANT CHANGE UP (ref 12–36)
CHLORIDE SERPL-SCNC: 106 MMOL/L — SIGNIFICANT CHANGE UP (ref 96–108)
CO2 SERPL-SCNC: 20 MMOL/L — LOW (ref 22–31)
CONFIRM APTT STACLOT: NEGATIVE — SIGNIFICANT CHANGE UP
CREAT SERPL-MCNC: 0.86 MG/DL — SIGNIFICANT CHANGE UP (ref 0.5–1.3)
DRVVT RATIO: 0.75 RATIO — SIGNIFICANT CHANGE UP (ref 0–1.03)
DRVVT SCREEN TO CONFIRM RATIO: SIGNIFICANT CHANGE UP
EGFR: 103 ML/MIN/1.73M2 — SIGNIFICANT CHANGE UP
GLUCOSE SERPL-MCNC: 102 MG/DL — HIGH (ref 70–99)
HCT VFR BLD CALC: 43.6 % — SIGNIFICANT CHANGE UP (ref 39–50)
HGB BLD-MCNC: 15.6 G/DL — SIGNIFICANT CHANGE UP (ref 13–17)
HIV-1 VIRAL LOAD RESULT: SIGNIFICANT CHANGE UP
HIV1 RNA # SERPL NAA+PROBE: SIGNIFICANT CHANGE UP COPIES/ML
HIV1 RNA SER-IMP: SIGNIFICANT CHANGE UP
HIV1 RNA SERPL NAA+PROBE-ACNC: SIGNIFICANT CHANGE UP
HIV1 RNA SERPL NAA+PROBE-LOG#: SIGNIFICANT CHANGE UP LG COP/ML
MAGNESIUM SERPL-MCNC: 2 MG/DL — SIGNIFICANT CHANGE UP (ref 1.6–2.6)
MCHC RBC-ENTMCNC: 35.8 GM/DL — SIGNIFICANT CHANGE UP (ref 32–36)
MCHC RBC-ENTMCNC: 39.8 PG — HIGH (ref 27–34)
MCV RBC AUTO: 111.2 FL — HIGH (ref 80–100)
NRBC # BLD: 0 /100 WBCS — SIGNIFICANT CHANGE UP (ref 0–0)
PHOSPHATE SERPL-MCNC: 3.4 MG/DL — SIGNIFICANT CHANGE UP (ref 2.5–4.5)
PLATELET # BLD AUTO: 257 K/UL — SIGNIFICANT CHANGE UP (ref 150–400)
POTASSIUM SERPL-MCNC: 3.9 MMOL/L — SIGNIFICANT CHANGE UP (ref 3.5–5.3)
POTASSIUM SERPL-SCNC: 3.9 MMOL/L — SIGNIFICANT CHANGE UP (ref 3.5–5.3)
PROT C ACT/NOR PPP: 98 % — SIGNIFICANT CHANGE UP (ref 74–150)
RBC # BLD: 3.92 M/UL — LOW (ref 4.2–5.8)
RBC # FLD: 12.4 % — SIGNIFICANT CHANGE UP (ref 10.3–14.5)
SODIUM SERPL-SCNC: 138 MMOL/L — SIGNIFICANT CHANGE UP (ref 135–145)
T-CELL CD4 SUBSET PNL BLD: 604 CELLS/UL — SIGNIFICANT CHANGE UP (ref 489–1457)
WBC # BLD: 6.06 K/UL — SIGNIFICANT CHANGE UP (ref 3.8–10.5)
WBC # FLD AUTO: 6.06 K/UL — SIGNIFICANT CHANGE UP (ref 3.8–10.5)

## 2023-04-17 PROCEDURE — 83036 HEMOGLOBIN GLYCOSYLATED A1C: CPT

## 2023-04-17 PROCEDURE — 86359 T CELLS TOTAL COUNT: CPT

## 2023-04-17 PROCEDURE — 85613 RUSSELL VIPER VENOM DILUTED: CPT

## 2023-04-17 PROCEDURE — 85303 CLOT INHIBIT PROT C ACTIVITY: CPT

## 2023-04-17 PROCEDURE — 86360 T CELL ABSOLUTE COUNT/RATIO: CPT

## 2023-04-17 PROCEDURE — 80053 COMPREHEN METABOLIC PANEL: CPT

## 2023-04-17 PROCEDURE — 83605 ASSAY OF LACTIC ACID: CPT

## 2023-04-17 PROCEDURE — 85027 COMPLETE CBC AUTOMATED: CPT

## 2023-04-17 PROCEDURE — 86147 CARDIOLIPIN ANTIBODY EA IG: CPT

## 2023-04-17 PROCEDURE — 99233 SBSQ HOSP IP/OBS HIGH 50: CPT

## 2023-04-17 PROCEDURE — 81001 URINALYSIS AUTO W/SCOPE: CPT

## 2023-04-17 PROCEDURE — 93306 TTE W/DOPPLER COMPLETE: CPT | Mod: 26

## 2023-04-17 PROCEDURE — 93312 ECHO TRANSESOPHAGEAL: CPT

## 2023-04-17 PROCEDURE — 85306 CLOT INHIBIT PROT S FREE: CPT

## 2023-04-17 PROCEDURE — 85610 PROTHROMBIN TIME: CPT

## 2023-04-17 PROCEDURE — 93306 TTE W/DOPPLER COMPLETE: CPT

## 2023-04-17 PROCEDURE — 85025 COMPLETE CBC W/AUTO DIFF WBC: CPT

## 2023-04-17 PROCEDURE — 85300 ANTITHROMBIN III ACTIVITY: CPT

## 2023-04-17 PROCEDURE — 83735 ASSAY OF MAGNESIUM: CPT

## 2023-04-17 PROCEDURE — 0042T: CPT

## 2023-04-17 PROCEDURE — 85301 ANTITHROMBIN III ANTIGEN: CPT

## 2023-04-17 PROCEDURE — 80061 LIPID PANEL: CPT

## 2023-04-17 PROCEDURE — 87635 SARS-COV-2 COVID-19 AMP PRB: CPT

## 2023-04-17 PROCEDURE — 84443 ASSAY THYROID STIM HORMONE: CPT

## 2023-04-17 PROCEDURE — 85730 THROMBOPLASTIN TIME PARTIAL: CPT

## 2023-04-17 PROCEDURE — 97161 PT EVAL LOW COMPLEX 20 MIN: CPT

## 2023-04-17 PROCEDURE — 97165 OT EVAL LOW COMPLEX 30 MIN: CPT

## 2023-04-17 PROCEDURE — 81240 F2 GENE: CPT

## 2023-04-17 PROCEDURE — 83090 ASSAY OF HOMOCYSTEINE: CPT

## 2023-04-17 PROCEDURE — 85379 FIBRIN DEGRADATION QUANT: CPT

## 2023-04-17 PROCEDURE — 84484 ASSAY OF TROPONIN QUANT: CPT

## 2023-04-17 PROCEDURE — 70498 CT ANGIOGRAPHY NECK: CPT

## 2023-04-17 PROCEDURE — 84100 ASSAY OF PHOSPHORUS: CPT

## 2023-04-17 PROCEDURE — 82550 ASSAY OF CK (CPK): CPT

## 2023-04-17 PROCEDURE — 87536 HIV-1 QUANT&REVRSE TRNSCRPJ: CPT

## 2023-04-17 PROCEDURE — 93005 ELECTROCARDIOGRAM TRACING: CPT

## 2023-04-17 PROCEDURE — 99285 EMERGENCY DEPT VISIT HI MDM: CPT

## 2023-04-17 PROCEDURE — 36415 COLL VENOUS BLD VENIPUNCTURE: CPT

## 2023-04-17 PROCEDURE — 70450 CT HEAD/BRAIN W/O DYE: CPT

## 2023-04-17 PROCEDURE — 82962 GLUCOSE BLOOD TEST: CPT

## 2023-04-17 PROCEDURE — 71045 X-RAY EXAM CHEST 1 VIEW: CPT

## 2023-04-17 PROCEDURE — 70496 CT ANGIOGRAPHY HEAD: CPT

## 2023-04-17 PROCEDURE — 81241 F5 GENE: CPT

## 2023-04-17 PROCEDURE — 93312 ECHO TRANSESOPHAGEAL: CPT | Mod: 26

## 2023-04-17 PROCEDURE — 85598 HEXAGNAL PHOSPH PLTLT NEUTRL: CPT

## 2023-04-17 PROCEDURE — 80307 DRUG TEST PRSMV CHEM ANLYZR: CPT

## 2023-04-17 PROCEDURE — 80048 BASIC METABOLIC PNL TOTAL CA: CPT

## 2023-04-17 PROCEDURE — 70551 MRI BRAIN STEM W/O DYE: CPT

## 2023-04-17 PROCEDURE — 85307 ASSAY ACTIVATED PROTEIN C: CPT

## 2023-04-17 PROCEDURE — 96374 THER/PROPH/DIAG INJ IV PUSH: CPT

## 2023-04-17 PROCEDURE — 86146 BETA-2 GLYCOPROTEIN ANTIBODY: CPT

## 2023-04-17 RX ORDER — ASPIRIN/CALCIUM CARB/MAGNESIUM 324 MG
81 TABLET ORAL DAILY
Refills: 0 | Status: DISCONTINUED | OUTPATIENT
Start: 2023-04-18 | End: 2023-04-17

## 2023-04-17 RX ORDER — ATORVASTATIN CALCIUM 80 MG/1
1 TABLET, FILM COATED ORAL
Refills: 0
Start: 2023-04-17

## 2023-04-17 RX ORDER — ASPIRIN/CALCIUM CARB/MAGNESIUM 324 MG
1 TABLET ORAL
Qty: 0 | Refills: 0 | DISCHARGE
Start: 2023-04-17

## 2023-04-17 RX ORDER — CLOPIDOGREL BISULFATE 75 MG/1
1 TABLET, FILM COATED ORAL
Qty: 0 | Refills: 0 | DISCHARGE
Start: 2023-04-17

## 2023-04-17 RX ORDER — CLOPIDOGREL BISULFATE 75 MG/1
1 TABLET, FILM COATED ORAL
Refills: 0
Start: 2023-04-17

## 2023-04-17 RX ORDER — ASPIRIN/CALCIUM CARB/MAGNESIUM 324 MG
1 TABLET ORAL
Refills: 0
Start: 2023-04-17

## 2023-04-17 RX ORDER — ATORVASTATIN CALCIUM 80 MG/1
80 TABLET, FILM COATED ORAL AT BEDTIME
Refills: 0 | Status: DISCONTINUED | OUTPATIENT
Start: 2023-04-17 | End: 2023-04-17

## 2023-04-17 RX ORDER — APIXABAN 2.5 MG/1
5 TABLET, FILM COATED ORAL EVERY 12 HOURS
Refills: 0 | Status: COMPLETED | OUTPATIENT
Start: 2023-04-17 | End: 2023-04-17

## 2023-04-17 RX ORDER — APIXABAN 2.5 MG/1
1 TABLET, FILM COATED ORAL
Qty: 60 | Refills: 5
Start: 2023-04-17 | End: 2023-10-13

## 2023-04-17 RX ORDER — LORATADINE 10 MG/1
1 TABLET ORAL
Qty: 0 | Refills: 0 | DISCHARGE
Start: 2023-04-17

## 2023-04-17 RX ORDER — CLOPIDOGREL BISULFATE 75 MG/1
75 TABLET, FILM COATED ORAL DAILY
Refills: 0 | Status: DISCONTINUED | OUTPATIENT
Start: 2023-04-17 | End: 2023-04-17

## 2023-04-17 RX ORDER — ATORVASTATIN CALCIUM 80 MG/1
1 TABLET, FILM COATED ORAL
Qty: 0 | Refills: 0 | DISCHARGE
Start: 2023-04-17

## 2023-04-17 RX ORDER — ATORVASTATIN CALCIUM 80 MG/1
1 TABLET, FILM COATED ORAL
Qty: 30 | Refills: 5
Start: 2023-04-17 | End: 2023-10-13

## 2023-04-17 RX ORDER — ACETAMINOPHEN 500 MG
650 TABLET ORAL EVERY 6 HOURS
Refills: 0 | Status: ACTIVE | OUTPATIENT
Start: 2023-04-17 | End: 2024-03-15

## 2023-04-17 RX ADMIN — CLOPIDOGREL BISULFATE 75 MILLIGRAM(S): 75 TABLET, FILM COATED ORAL at 12:04

## 2023-04-17 RX ADMIN — APIXABAN 5 MILLIGRAM(S): 2.5 TABLET, FILM COATED ORAL at 19:08

## 2023-04-17 RX ADMIN — Medication 650 MILLIGRAM(S): at 11:07

## 2023-04-17 RX ADMIN — Medication 81 MILLIGRAM(S): at 12:04

## 2023-04-17 RX ADMIN — LORATADINE 10 MILLIGRAM(S): 10 TABLET ORAL at 12:04

## 2023-04-17 RX ADMIN — ENOXAPARIN SODIUM 40 MILLIGRAM(S): 100 INJECTION SUBCUTANEOUS at 12:05

## 2023-04-17 RX ADMIN — Medication 2 TABLET(S): at 07:03

## 2023-04-17 RX ADMIN — LOSARTAN POTASSIUM 25 MILLIGRAM(S): 100 TABLET, FILM COATED ORAL at 07:06

## 2023-04-17 RX ADMIN — Medication 2 TABLET(S): at 18:24

## 2023-04-17 NOTE — DISCHARGE NOTE PROVIDER - HOSPITAL COURSE
HPI: 52y Male with PMHx of HIV, newly diagnosed HTN 2 weeks ago presented to [facility] with right sided numbness in face, arm and leg. LKW 11:45am 4/14 while in meeting at work with sudden onset of symptoms. Initialy thought leg and arm fell asleep or nerve issue that would resolve on its own. Decreased sensation down to 70%. Patient walked to urgent care and was sent [facility]. On presentation, BP 170s (normally 140s). NIHSS 1 for decreased sensation. CTH/CTA unremarkable, but CTP suspicious for defect along left frontal and right parietal area. Patient currently reports resolution of facial numbness and significantly improving arm and leg numbness. Denies speech changes, vision changes, extremity weakness or loss of dexterity, CP, SOB, recent illness. Patient was admitted to Stroke unit for stroke workup.   Patient had the following workup done in house:  [x] HCT: neg  [x] CTA head/Neck: neg  [x] MRI brain w/out: right frontal and left thalamic infarct   [x] ANASTASIA:    CORE MEASURES:  [5] A1c     [106] LDL  The stroke etiology is likely secondary to:  [x]etiology workup still in progress            Physical exam at discharge:    Mental status: awake, alert, oriented to person, place, and time. Speech is fluent, able to name objects. Follows commands. Attention/concentration intact. No dysarthria, no aphasia.  Cranial nerves:   II: visual fields are full to confrontation. pupils equally round and reactive to light,   III, IV, VI: EOMI without nystagmus  V:  V1-V3 sensation intact,   VII: no facial droop, facie is symmetric with normal eye closure and smile  Motor: Normal bulk and tone, strength 5/5 in b/l UE and LE,  strength 5/5. No pronator drift  Sensation: intact to light touch. No neglect.  Coordination: No dysmetria on finger-to-nose and heel-to-shin  Reflexes: downgoing toes bilaterally  Gait: narrow and steady, no ataxia. Romberg negative    New medications on discharge: Atorvastatin 80 mg once a day, Aspirin 81 mg once a day, Plavix 75 mg once a day.   Labs to be followed up: per discretion of ID, PCP  Imaging to be done as outpatient: none  Further outpatient workup: f/u neurologist   HPI: 52y Male with PMHx of HIV, newly diagnosed HTN 2 weeks ago presented to [facility] with right sided numbness in face, arm and leg. LKW 11:45am 4/14 while in meeting at work with sudden onset of symptoms. Initialy thought leg and arm fell asleep or nerve issue that would resolve on its own. Decreased sensation down to 70%. Patient walked to urgent care and was sent [facility]. On presentation, BP 170s (normally 140s). NIHSS 1 for decreased sensation. CTH/CTA unremarkable, but CTP suspicious for defect along left frontal and right parietal area. Patient currently reports resolution of facial numbness and significantly improving arm and leg numbness. Denies speech changes, vision changes, extremity weakness or loss of dexterity, CP, SOB, recent illness. Patient was admitted to Stroke unit for stroke workup.   Patient had the following workup done in house:  [x] HCT: neg  [x] CTA head/Neck: neg  [x] MRI brain w/out: right frontal and left thalamic infarct   [x] ANASTASIA: positive for PFO, no LA/JEY/RA/RAA thrombus   CORE MEASURES:  [5] A1c     [106] LDL  The stroke etiology is likely secondary to:  [x]etiology workup still in progress            Physical exam at discharge:    Mental status: awake, alert, oriented to person, place, and time. Speech is fluent, able to name objects. Follows commands. Attention/concentration intact. No dysarthria, no aphasia.  Cranial nerves:   II: visual fields are full to confrontation. pupils equally round and reactive to light,   III, IV, VI: EOMI without nystagmus  V:  V1-V3 sensation intact,   VII: no facial droop, facie is symmetric with normal eye closure and smile  Motor: Normal bulk and tone, strength 5/5 in b/l UE and LE,  strength 5/5. No pronator drift  Sensation: intact to light touch. No neglect.  Coordination: No dysmetria on finger-to-nose and heel-to-shin  Reflexes: downgoing toes bilaterally  Gait: narrow and steady, no ataxia. Romberg negative    New medications on discharge: Atorvastatin 80 mg once a day, Aspirin 81 mg once a day, Plavix 75 mg once a day.   Labs to be followed up: per discretion of ID, PCP  Imaging to be done as outpatient: none  Further outpatient workup: f/u neurologist   HPI: 52y Male with PMHx of HIV, newly diagnosed HTN 2 weeks ago presented to [facility] with right sided numbness in face, arm and leg. LKW 11:45am 4/14 while in meeting at work with sudden onset of symptoms. Initialy thought leg and arm fell asleep or nerve issue that would resolve on its own. Decreased sensation down to 70%. Patient walked to urgent care and was sent [facility]. On presentation, BP 170s (normally 140s). NIHSS 1 for decreased sensation. CTH/CTA unremarkable, but CTP suspicious for defect along left frontal and right parietal area. Patient currently reports resolution of facial numbness and significantly improving arm and leg numbness. Denies speech changes, vision changes, extremity weakness or loss of dexterity, CP, SOB, recent illness. Patient was admitted to Stroke unit for stroke workup.   Patient had the following workup done in house:  [x] HCT: neg  [x] CTA head/Neck: neg  [x] MRI brain w/out: right frontal and left thalamic infarct   [x] ANASTASIA: positive for PFO, no LA/JEY/RA/RAA thrombus   CORE MEASURES:  [5] A1c     [106] LDL  The stroke etiology is likely secondary to:  [x]etiology workup still in progress            Physical exam at discharge:    Mental status: awake, alert, oriented to person, place, and time. Speech is fluent, able to name objects. Follows commands. Attention/concentration intact. No dysarthria, no aphasia.  Cranial nerves:   II: visual fields are full to confrontation. pupils equally round and reactive to light,   III, IV, VI: EOMI without nystagmus  V:  V1-V3 sensation intact,   VII: no facial droop, facie is symmetric with normal eye closure and smile  Motor: Normal bulk and tone, strength 5/5 in b/l UE and LE,  strength 5/5. No pronator drift  Sensation: intact to light touch. No neglect.  Coordination: No dysmetria on finger-to-nose and heel-to-shin  Reflexes: downgoing toes bilaterally  Gait: narrow and steady, no ataxia. Romberg negative    New medications on discharge: Atorvastatin 80 mg once a day, Aspirin 81 mg once a day, Plavix 75 mg once a day.   Labs to be followed up: hypercoag labs, per discretion of ID, PCP  Imaging to be done as outpatient: none  Further outpatient workup: f/u neurologist, EP, cardiologist, ID, structural heard specialist (Dr. Alfaro)   HPI: 52y Male with PMHx of HIV, newly diagnosed HTN 2 weeks ago presented to [facility] with right sided numbness in face, arm and leg. LKW 11:45am 4/14 while in meeting at work with sudden onset of symptoms. Initialy thought leg and arm fell asleep or nerve issue that would resolve on its own. Decreased sensation down to 70%. Patient walked to urgent care and was sent [facility]. On presentation, BP 170s (normally 140s). NIHSS 1 for decreased sensation. CTH/CTA unremarkable, but CTP suspicious for defect along left frontal and right parietal area. Patient currently reports resolution of facial numbness and significantly improving arm and leg numbness. Denies speech changes, vision changes, extremity weakness or loss of dexterity, CP, SOB, recent illness. Patient was admitted to Stroke unit for stroke workup.   Patient had the following workup done in house:  [x] HCT: neg  [x] CTA head/Neck: neg  [x] MRI brain w/out: right frontal and left thalamic infarct   [x] ANASTASIA: positive for PFO, no LA/JEY/RA/RAA thrombus  [ ] Loop recorder: patient didn't want to wait another day for LR placement along with structural heart specialist consult and repeating hypercoagulable panel.   [x] Anticardiolipin Ab positive   CORE MEASURES:  [5] A1c     [106] LDL  The stroke etiology is likely secondary to:  [x]etiology workup still in progress            Physical exam at discharge:    Mental status: awake, alert, oriented to person, place, and time. Speech is fluent, able to name objects. Follows commands. Attention/concentration intact. No dysarthria, no aphasia.  Cranial nerves:   II: visual fields are full to confrontation. pupils equally round and reactive to light,   III, IV, VI: EOMI without nystagmus  V:  V1-V3 sensation intact,   VII: no facial droop, facie is symmetric with normal eye closure and smile  Motor: Normal bulk and tone, strength 5/5 in b/l UE and LE,  strength 5/5. No pronator drift  Sensation: intact to light touch. No neglect.  Coordination: No dysmetria on finger-to-nose and heel-to-shin  Reflexes: downgoing toes bilaterally  Gait: narrow and steady, no ataxia. Romberg negative    New medications on discharge: Atorvastatin 80 mg once a day, Aspirin 81 mg once a day, Plavix 75 mg once a day.   Labs to be followed up: hypercoag labs, per discretion of ID, hemathologist, PCP  Imaging to be done as outpatient: none  Further outpatient workup: f/u neurologist, EP, cardiologist, ID, structural heard specialist (Dr. Alfaro)   HPI: 52y Male with PMHx of HIV, newly diagnosed HTN 2 weeks ago presented to [facility] with right sided numbness in face, arm and leg. LKW 11:45am 4/14 while in meeting at work with sudden onset of symptoms. Initialy thought leg and arm fell asleep or nerve issue that would resolve on its own. Decreased sensation down to 70%. Patient walked to urgent care and was sent [facility]. On presentation, BP 170s (normally 140s). NIHSS 1 for decreased sensation. CTH/CTA unremarkable, but CTP suspicious for defect along left frontal and right parietal area. Patient currently reports resolution of facial numbness and significantly improving arm and leg numbness. Denies speech changes, vision changes, extremity weakness or loss of dexterity, CP, SOB, recent illness. Patient was admitted to Stroke unit for stroke workup.   Patient had the following workup done in house:  [x] HCT: neg  [x] CTA head/Neck: neg  [x] MRI brain w/out: right frontal and left thalamic infarct   [x] ANASTASIA: positive for PFO, no LA/JEY/RA/RAA thrombus  [ ] Loop recorder: patient didn't want to wait another day for LR placement along with structural heart specialist consult and repeating hypercoagulable panel.   [x] Anticardiolipin Ab positive   CORE MEASURES:  [5] A1c     [106] LDL  The stroke etiology is likely secondary to:  [x]etiology workup still in progress            Physical exam at discharge:    Mental status: awake, alert, oriented to person, place, and time. Speech is fluent, able to name objects. Follows commands. Attention/concentration intact. No dysarthria, no aphasia.  Cranial nerves:   II: visual fields are full to confrontation. pupils equally round and reactive to light,   III, IV, VI: EOMI without nystagmus  V:  V1-V3 sensation intact,   VII: no facial droop, facie is symmetric with normal eye closure and smile  Motor: Normal bulk and tone, strength 5/5 in b/l UE and LE,  strength 5/5. No pronator drift  Sensation: intact to light touch. No neglect.  Coordination: No dysmetria on finger-to-nose and heel-to-shin  Reflexes: downgoing toes bilaterally  Gait: narrow and steady, no ataxia. Romberg negative    New medications on discharge: Atorvastatin 80 mg once a day, Apixaban 5 mg bid  Labs to be followed up: hypercoag labs, per discretion of ID, hematologist, PCP  Imaging to be done as outpatient: none  Further outpatient workup: f/u neurologist () in 2 weeks, EP, cardiologist, ID, structural heard specialist (Dr. Alfaro)

## 2023-04-17 NOTE — PROGRESS NOTE ADULT - ASSESSMENT
PCP: Dr. Major Castrejon - [facility]  52M w h/o HIV (CD4 607), HTNN p/w numbness in R face and RUE, RLE, found to have L thalamic stroke    #L thalamic stroke - sxs improved  A1C 5.0. . TSH 1.2  On DAPT, Atorva 80   - Anticardiolipin ab positive.     #HIV - on home HIV medications. CD4 607. Denies h/o AIDS defining illness  #HTN - Bp remains elevated 150-160. BP target per stroke - neuro. On home losartan 25 daily  #HLD - . Started on lipitor 80    Recommend  f/u ANASTASIA, TTE (nml LVEF, +PFO). ILR    PPx: SQL  DISPO: Independent per PT

## 2023-04-17 NOTE — DISCHARGE NOTE PROVIDER - NSDCFUADDAPPT_GEN_ALL_CORE_FT
- Further outpatient workup: f/u neurologist, EP, cardiologist, ID, structural heard specialist (Dr. Alfaro)  - Hypercoagulable panel - Further outpatient workup: f/u neurologist ( in 2 weeks after d/c)  - EP (, [facility] [phone]), structural heard specialist (Dr. Alfaro)  - Hypercoagulable panel

## 2023-04-17 NOTE — PROGRESS NOTE ADULT - SUBJECTIVE AND OBJECTIVE BOX
INTERVAL HPI/OVERNIGHT EVENTS: calvin o/n    SUBJECTIVE: Patient seen and examined at bedside.   Pt reports symptoms largely resolved. Does report intermittent discomfort in R arm that comes and goes - feels there's tightness. Denies numbness.   No fevers, chills, sweats, chest pain, dyspnea, N/V/Abd pain. No dysuria, diarrhea. Ambulating wo LH/dizziness.     OBJECTIVE:    VITAL SIGNS:  ICU Vital Signs Last 24 Hrs  T(C): 36.8 (17 Apr 2023 13:59), Max: 37.1 (17 Apr 2023 01:00)  T(F): 98.2 (17 Apr 2023 13:59), Max: 98.7 (17 Apr 2023 01:00)  HR: 72 (17 Apr 2023 09:07) (68 - 72)  BP: 161/99 (17 Apr 2023 09:07) (148/80 - 167/104)  BP(mean): 125 (17 Apr 2023 09:07) (107 - 130)  ABP: --  ABP(mean): --  RR: 16 (17 Apr 2023 09:07) (16 - 17)  SpO2: 100% (17 Apr 2023 09:07) (95% - 100%)    O2 Parameters below as of 17 Apr 2023 09:07  Patient On (Oxygen Delivery Method): room air              CAPILLARY BLOOD GLUCOSE          PHYSICAL EXAM:  GEN: Male in NAD on RA  HEENT: NC/AT, MMM  CV: RRR, nml S1S2, no murmurs  PULM: nml effort, CTAB  ABD: Soft, non-distended, NABS, non-tender  NEURO  A/O x3,  EOMI, No droop  BUE 5/5 and BLE 5/5.   Normal finger to nose.   Sensation intact  PSYCH: Appropriate      MEDICATIONS:  MEDICATIONS  (STANDING):  aspirin enteric coated 81 milliGRAM(s) Oral daily  atorvastatin 80 milliGRAM(s) Oral at bedtime  clopidogrel Tablet 75 milliGRAM(s) Oral daily  enoxaparin Injectable 40 milliGRAM(s) SubCutaneous every 24 hours  lopinavir 200 mG/ritonavir 50 mG 2 Tablet(s) Oral <User Schedule>  loratadine 10 milliGRAM(s) Oral daily  losartan 25 milliGRAM(s) Oral daily  zidovudine 300 mG/lamivudine 150 mG 1 Tablet(s) Oral <User Schedule>    MEDICATIONS  (PRN):  acetaminophen     Tablet .. 650 milliGRAM(s) Oral every 6 hours PRN Mild Pain (1 - 3)  albuterol    90 MICROgram(s) HFA Inhaler 2 Puff(s) Inhalation every 6 hours PRN Respiratory Distress      ALLERGIES:  Allergies    No Known Allergies    Intolerances        LABS:                        15.6   6.06  )-----------( 257      ( 17 Apr 2023 05:30 )             43.6     04-17    138  |  106  |  12  ----------------------------<  102<H>  3.9   |  20<L>  |  0.86    Ca    8.9      17 Apr 2023 05:30  Phos  3.4     04-17  Mg     2.0     04-17            RADIOLOGY & ADDITIONAL TESTS: Reviewed.

## 2023-04-17 NOTE — DISCHARGE NOTE PROVIDER - NSDCMRMEDTOKEN_GEN_ALL_CORE_FT
aspirin 81 mg oral delayed release tablet: 1 tab(s) orally once a day  atorvastatin 80 mg oral tablet: 1 tab(s) orally once a day (at bedtime)  clopidogrel 75 mg oral tablet: 1 tab(s) orally once a day  Kaletra 200 mg-50 mg oral tablet: 2 orally 2 times a day  lamivudine-zidovudine 150 mg-300 mg oral tablet: 1 orally once a day  loratadine 10 mg oral tablet: 1 tab(s) orally once a day  olmesartan 20 mg oral tablet: 1 orally once a day   apixaban 5 mg oral tablet: 1 tab(s) orally every 12 hours  atorvastatin 80 mg oral tablet: 1 tab(s) orally once a day (at bedtime)  Kaletra 200 mg-50 mg oral tablet: 2 orally 2 times a day  lamivudine-zidovudine 150 mg-300 mg oral tablet: 1 orally once a day  loratadine 10 mg oral tablet: 1 tab(s) orally once a day  olmesartan 20 mg oral tablet: 1 orally once a day

## 2023-04-17 NOTE — DISCHARGE NOTE NURSING/CASE MANAGEMENT/SOCIAL WORK - NSDCFUADDAPPT_GEN_ALL_CORE_FT
- Further outpatient workup: f/u neurologist ( in 2 weeks after d/c)  - EP (, [facility] [phone]), structural heard specialist (Dr. Alfaro)  - Hypercoagulable panel

## 2023-04-17 NOTE — DISCHARGE NOTE PROVIDER - NSDCCPCAREPLAN_GEN_ALL_CORE_FT
PRINCIPAL DISCHARGE DIAGNOSIS  Diagnosis: Acute thrombotic stroke  Assessment and Plan of Treatment: - c/w Plavix 75 mg once a day  - c/w Aspirin 81 mg once a day  - c/w Atorvastatin 80 mg once a day  - BP management  - f/u neurologist, cardiologist      SECONDARY DISCHARGE DIAGNOSES  Diagnosis: HIV disease  Assessment and Plan of Treatment: - c/w home HIV meds  - f/u ID    Diagnosis: HTN (hypertension)  Assessment and Plan of Treatment: - c/w home BP medications (Olmisartan)  - DASH diet     PRINCIPAL DISCHARGE DIAGNOSIS  Diagnosis: Acute thrombotic stroke  Assessment and Plan of Treatment: -c/w Apixaban 5mg bid  - c/w Atorvastatin 80 mg once a day  - BP management  - f/u neurologist, cardiologist      SECONDARY DISCHARGE DIAGNOSES  Diagnosis: HIV disease  Assessment and Plan of Treatment: - c/w home HIV meds  - f/u ID    Diagnosis: HTN (hypertension)  Assessment and Plan of Treatment: - c/w home BP medications (Olmisartan)  - DASH diet    Diagnosis: Anticardiolipin antibody positive  Assessment and Plan of Treatment: - f/u hypercoagulible panel  - c/w Apixaban 5 mg twice a day  - possible hemathology consult    Diagnosis: PFO (patent foramen ovale)  Assessment and Plan of Treatment: - consult and f/u w structural cardiology specialist ()

## 2023-04-17 NOTE — DISCHARGE NOTE PROVIDER - CARE PROVIDER_API CALL
Oswaldo Montano)  Neurology; Vascular Neurology  [address], [address] [place] [address]  Phone: [phone]  Fax: [phone]  Follow Up Time:

## 2023-04-17 NOTE — DISCHARGE NOTE NURSING/CASE MANAGEMENT/SOCIAL WORK - PATIENT PORTAL LINK FT
You can access the FollowMyHealth Patient Portal offered by [facility] by registering at the following website: [email or web address]. By joining [facility]’s FollowMyHealth portal, you will also be able to view your health information using other applications (apps) compatible with our system.

## 2023-04-17 NOTE — DISCHARGE NOTE NURSING/CASE MANAGEMENT/SOCIAL WORK - NSDCPEFALRISK_GEN_ALL_CORE
For information on Fall & Injury Prevention, visit: [email or web address] OR  [email or web address] OR  https://www.cdc.gov/steadi/patient.html

## 2023-04-18 PROBLEM — Z00.00 ENCOUNTER FOR PREVENTIVE HEALTH EXAMINATION: Status: ACTIVE | Noted: 2023-04-18

## 2023-04-18 PROBLEM — I10 ESSENTIAL (PRIMARY) HYPERTENSION: Chronic | Status: ACTIVE | Noted: 2023-04-14

## 2023-04-18 PROBLEM — B20 HUMAN IMMUNODEFICIENCY VIRUS [HIV] DISEASE: Chronic | Status: ACTIVE | Noted: 2023-04-14

## 2023-04-18 LAB
APCR PPP: 2.57 RATIO — SIGNIFICANT CHANGE UP
APCR PPP: 2.62 RATIO — SIGNIFICANT CHANGE UP
AT III ACT/NOR PPP CHRO: 99 % — SIGNIFICANT CHANGE UP (ref 85–135)
AT III AG PPP IA-MCNC: 22 MG/DL — SIGNIFICANT CHANGE UP (ref 19–31)
B2 GLYCOPROT1 AB SER QL: NEGATIVE — SIGNIFICANT CHANGE UP
CARDIOLIPIN IGM SER-MCNC: 45.7 MPL — HIGH (ref 0–12.5)
CARDIOLIPIN IGM SER-MCNC: 6 GPL — SIGNIFICANT CHANGE UP (ref 0–12.5)
HCYS SERPL-MCNC: 8.9 UMOL/L — SIGNIFICANT CHANGE UP
PROT C ACT/NOR PPP: 101 % — SIGNIFICANT CHANGE UP (ref 74–150)
PROT S FREE PPP-ACNC: 79 % — SIGNIFICANT CHANGE UP (ref 63–140)

## 2023-04-19 LAB
CARDIOLIPIN AB SER-ACNC: POSITIVE
DEPRECATED CARDIOLIPIN IGA SER: 5.2 APL — SIGNIFICANT CHANGE UP (ref 0–12.5)

## 2023-04-20 DIAGNOSIS — G81.91 HEMIPLEGIA, UNSPECIFIED AFFECTING RIGHT DOMINANT SIDE: ICD-10-CM

## 2023-04-20 DIAGNOSIS — I63.9 CEREBRAL INFARCTION, UNSPECIFIED: ICD-10-CM

## 2023-04-20 DIAGNOSIS — Q21.12 PATENT FORAMEN OVALE: ICD-10-CM

## 2023-04-20 DIAGNOSIS — R29.701 NIHSS SCORE 1: ICD-10-CM

## 2023-04-20 DIAGNOSIS — B20 HUMAN IMMUNODEFICIENCY VIRUS [HIV] DISEASE: ICD-10-CM

## 2023-04-20 DIAGNOSIS — I10 ESSENTIAL (PRIMARY) HYPERTENSION: ICD-10-CM

## 2023-04-20 LAB — PROT S FREE PPP-ACNC: 87 % — SIGNIFICANT CHANGE UP (ref 63–140)

## 2023-04-21 LAB
CONFIRM APTT STACLOT: NEGATIVE — SIGNIFICANT CHANGE UP
DNA PLOIDY SPEC FC-IMP: SIGNIFICANT CHANGE UP
DRVVT RATIO: 0.78 RATIO — SIGNIFICANT CHANGE UP (ref 0–1.03)
DRVVT SCREEN TO CONFIRM RATIO: SIGNIFICANT CHANGE UP
PTR INTERPRETATION: SIGNIFICANT CHANGE UP

## 2023-05-10 ENCOUNTER — APPOINTMENT (OUTPATIENT)
Dept: HEART AND VASCULAR | Facility: CLINIC | Age: 53
End: 2023-05-10
